# Patient Record
Sex: FEMALE | Race: WHITE | NOT HISPANIC OR LATINO | Employment: OTHER | URBAN - METROPOLITAN AREA
[De-identification: names, ages, dates, MRNs, and addresses within clinical notes are randomized per-mention and may not be internally consistent; named-entity substitution may affect disease eponyms.]

---

## 2017-01-04 RX ORDER — MELOXICAM 15 MG/1
TABLET ORAL
Qty: 90 TABLET | Refills: 0 | Status: SHIPPED | OUTPATIENT
Start: 2017-01-04 | End: 2017-11-30

## 2017-02-20 ENCOUNTER — TELEPHONE (OUTPATIENT)
Dept: INTERNAL MEDICINE | Facility: CLINIC | Age: 67
End: 2017-02-20

## 2017-02-20 DIAGNOSIS — G89.29 CHRONIC LOW BACK PAIN WITH SCIATICA, SCIATICA LATERALITY UNSPECIFIED, UNSPECIFIED BACK PAIN LATERALITY: Primary | ICD-10-CM

## 2017-02-20 DIAGNOSIS — M54.40 CHRONIC LOW BACK PAIN WITH SCIATICA, SCIATICA LATERALITY UNSPECIFIED, UNSPECIFIED BACK PAIN LATERALITY: Primary | ICD-10-CM

## 2017-02-20 NOTE — TELEPHONE ENCOUNTER
Patient called stating that she would like a referral to Fatoumata Shipman physical therapist with [facility] (fax [phone]) for continued sciatic pain, pain in both hips, and inability to walk.  Please advise.

## 2017-03-10 ENCOUNTER — OFFICE VISIT (OUTPATIENT)
Dept: INTERNAL MEDICINE | Facility: CLINIC | Age: 67
End: 2017-03-10

## 2017-03-10 VITALS
HEART RATE: 109 BPM | DIASTOLIC BLOOD PRESSURE: 70 MMHG | SYSTOLIC BLOOD PRESSURE: 124 MMHG | HEIGHT: 67 IN | TEMPERATURE: 98.5 F | WEIGHT: 163.2 LBS | OXYGEN SATURATION: 97 % | BODY MASS INDEX: 25.62 KG/M2

## 2017-03-10 DIAGNOSIS — M54.50 CHRONIC BILATERAL LOW BACK PAIN WITHOUT SCIATICA: ICD-10-CM

## 2017-03-10 DIAGNOSIS — G47.09 OTHER INSOMNIA: ICD-10-CM

## 2017-03-10 DIAGNOSIS — M54.16 LUMBAR RADICULOPATHY: Primary | ICD-10-CM

## 2017-03-10 DIAGNOSIS — F17.200 TOBACCO DEPENDENCE: ICD-10-CM

## 2017-03-10 DIAGNOSIS — G89.29 CHRONIC BILATERAL LOW BACK PAIN WITHOUT SCIATICA: ICD-10-CM

## 2017-03-10 DIAGNOSIS — Z12.39 BREAST CANCER SCREENING: ICD-10-CM

## 2017-03-10 DIAGNOSIS — F33.9 EPISODE OF RECURRENT MAJOR DEPRESSIVE DISORDER, UNSPECIFIED DEPRESSION EPISODE SEVERITY (HCC): ICD-10-CM

## 2017-03-10 PROCEDURE — 99214 OFFICE O/P EST MOD 30 MIN: CPT | Performed by: FAMILY MEDICINE

## 2017-03-10 RX ORDER — AMITRIPTYLINE HYDROCHLORIDE 25 MG/1
25 TABLET, FILM COATED ORAL NIGHTLY
Qty: 90 TABLET | Refills: 2 | Status: SHIPPED | OUTPATIENT
Start: 2017-03-10 | End: 2017-11-30

## 2017-03-10 RX ORDER — ZOLPIDEM TARTRATE 5 MG/1
2.5 TABLET ORAL NIGHTLY PRN
Qty: 30 TABLET | Refills: 0 | Status: SHIPPED | OUTPATIENT
Start: 2017-03-10 | End: 2017-11-30

## 2017-03-10 RX ORDER — TRAMADOL HYDROCHLORIDE 50 MG/1
50 TABLET ORAL EVERY 8 HOURS PRN
Qty: 90 TABLET | Refills: 0 | Status: SHIPPED | OUTPATIENT
Start: 2017-03-10 | End: 2017-04-21 | Stop reason: SDUPTHER

## 2017-03-10 RX ORDER — PREDNISONE 10 MG/1
TABLET ORAL
Qty: 40 TABLET | Refills: 0 | Status: SHIPPED | OUTPATIENT
Start: 2017-03-10 | End: 2017-11-30

## 2017-03-10 NOTE — PROGRESS NOTES
Subjective   Laila Lea is a 66 y.o. female.     Chief Complaint   Patient presents with   • patient in for right hip pain/sciatica     Chronic low back pain insomnia  Piriformis syndrome  History of Present Illness patient comes in for chronic right hip pain low back pain with some intermittent radiating quality down her leg she's not following any loss of bowel or bladder function she has gone to physical therapy and had about 6 visits and exercises were shown to help with his piriformis syndrome shin x-ray of her hip approximately a year and half ago which revealed no evidence arthritis she is using zolpidem intermittently with amitriptyline for sleep and tramadol for low back pain flareup she is not walking as much as she used to because of the pain he had been to [place] and [place] over the last year and was worn out with hiking shedid.  The following portions of the patient's history were reviewed and updated as appropriate: allergies, current medications, past family history, past medical history, past social history, past surgical history and problem list.    Review of Systems   Constitutional: Negative.    HENT: Negative.    Respiratory: Negative.    Cardiovascular: Negative.    Gastrointestinal: Negative.    Genitourinary: Negative.    Musculoskeletal: Positive for arthralgias, back pain, gait problem and myalgias.   Hematological: Negative.    Psychiatric/Behavioral: Negative.        Objective   Physical Exam   Constitutional: She is oriented to person, place, and time. She appears well-developed and well-nourished. No distress.   HENT:   Head: Normocephalic and atraumatic.   Eyes: Conjunctivae and EOM are normal. Pupils are equal, round, and reactive to light. No scleral icterus.   Neck: Normal range of motion. Neck supple.   Cardiovascular: Normal rate, regular rhythm and normal heart sounds.  Exam reveals no gallop.    No murmur heard.  Pulmonary/Chest: Effort normal and breath sounds  normal. No respiratory distress. She has no wheezes. She has no rales. She exhibits no tenderness.   Abdominal: Soft. There is no tenderness.   Musculoskeletal: She exhibits tenderness. She exhibits no deformity.   Neurological: She is alert and oriented to person, place, and time. She has normal reflexes. She displays no atrophy, no tremor and normal reflexes. No sensory deficit. She exhibits normal muscle tone. Coordination normal.   Reflex Scores:       Patellar reflexes are 2+ on the right side and 2+ on the left side.       Achilles reflexes are 2+ on the right side and 2+ on the left side.  Skin: Skin is warm and dry. No rash noted. She is not diaphoretic.   Psychiatric: She has a normal mood and affect. Her behavior is normal. Judgment and thought content normal.   Nursing note and vitals reviewed.      Assessment/Plan   Laila was seen today for patient in for right hip pain/sciatica.    Diagnoses and all orders for this visit:    Lumbar radiculopathy    Episode of recurrent major depressive disorder, unspecified depression episode severity    Other insomnia    Chronic bilateral low back pain without sciatica    Tobacco dependence    Breast cancer screening  -     Mammo screening bilateral; Future    Other orders  -     predniSONE (DELTASONE) 10 MG tablet; 4 for 4 days 3 for 4 days 2 for 4 days 1 for 4 days  -     traMADol (ULTRAM) 50 MG tablet; Take 1 tablet by mouth Every 8 (Eight) Hours As Needed for moderate pain (4-6).  -     zolpidem (AMBIEN) 5 MG tablet; Take 0.5 tablets by mouth At Night As Needed for sleep.  -     amitriptyline (ELAVIL) 25 MG tablet; Take 1 tablet by mouth Every Night.      Follow-up if symptoms persist otherwise as needed  Or 6 months for chronic medical problems

## 2017-04-06 ENCOUNTER — TELEPHONE (OUTPATIENT)
Dept: INTERNAL MEDICINE | Facility: CLINIC | Age: 67
End: 2017-04-06

## 2017-04-06 NOTE — TELEPHONE ENCOUNTER
Patient called stating that she would like a refill for Prednisone 10 mg.  This worked well for her pain.  She does have an appointment with [facility] next Friday.  Please advise.

## 2017-04-10 NOTE — TELEPHONE ENCOUNTER
LMA - patient notified.  Patient asked to call if she would like to have a referral to physical therapy.

## 2017-04-11 RX ORDER — ESCITALOPRAM OXALATE 20 MG/1
TABLET ORAL
Qty: 90 TABLET | Refills: 0 | Status: SHIPPED | OUTPATIENT
Start: 2017-04-11 | End: 2017-10-09 | Stop reason: SDUPTHER

## 2017-04-11 RX ORDER — LISINOPRIL 10 MG/1
TABLET ORAL
Qty: 90 TABLET | Refills: 0 | Status: SHIPPED | OUTPATIENT
Start: 2017-04-11 | End: 2017-07-07 | Stop reason: SDUPTHER

## 2017-04-21 ENCOUNTER — HOSPITAL ENCOUNTER (OUTPATIENT)
Dept: MAMMOGRAPHY | Facility: HOSPITAL | Age: 67
Discharge: HOME | End: 2017-04-21
Admitting: FAMILY MEDICINE

## 2017-04-21 DIAGNOSIS — Z12.39 BREAST CANCER SCREENING: ICD-10-CM

## 2017-04-21 PROCEDURE — G0202 SCR MAMMO BI INCL CAD: HCPCS

## 2017-04-21 RX ORDER — TRAMADOL HYDROCHLORIDE 50 MG/1
TABLET ORAL
Qty: 90 TABLET | Refills: 0 | OUTPATIENT
Start: 2017-04-21 | End: 2017-06-15 | Stop reason: SDUPTHER

## 2017-04-26 ENCOUNTER — TELEPHONE (OUTPATIENT)
Dept: INTERNAL MEDICINE | Facility: CLINIC | Age: 67
End: 2017-04-26

## 2017-04-26 NOTE — TELEPHONE ENCOUNTER
----- Message from James Bourgeois MD sent at 4/25/2017  4:39 PM EDT -----  Mammogram stable follow-up 1 year

## 2017-06-15 RX ORDER — TRAMADOL HYDROCHLORIDE 50 MG/1
TABLET ORAL
Qty: 90 TABLET | Refills: 0 | OUTPATIENT
Start: 2017-06-15 | End: 2017-07-18 | Stop reason: SDUPTHER

## 2017-07-05 ENCOUNTER — TELEPHONE (OUTPATIENT)
Dept: INTERNAL MEDICINE | Facility: CLINIC | Age: 67
End: 2017-07-05

## 2017-07-05 NOTE — TELEPHONE ENCOUNTER
Patient called stating her back pain is getting much worse.  She has been on the Tramadol for so long she does not think that it helps her as much as it once did.  She did try a friend's Lortab yesterday and felt much better.  Patient wanted to know if she could try a different pain medication.  Please advise.

## 2017-07-06 NOTE — TELEPHONE ENCOUNTER
She was seen by orthopedics at [facility] by Dr. Santiago and Dr. Munson recommend follow-up through their office for ongoing management of her lumbar pain

## 2017-07-07 ENCOUNTER — TELEPHONE (OUTPATIENT)
Dept: INTERNAL MEDICINE | Facility: CLINIC | Age: 67
End: 2017-07-07

## 2017-07-07 RX ORDER — ESCITALOPRAM OXALATE 20 MG/1
TABLET ORAL
Qty: 90 TABLET | Refills: 0 | Status: SHIPPED | OUTPATIENT
Start: 2017-07-07 | End: 2017-10-09 | Stop reason: SDUPTHER

## 2017-07-07 RX ORDER — LISINOPRIL 10 MG/1
TABLET ORAL
Qty: 90 TABLET | Refills: 0 | Status: SHIPPED | OUTPATIENT
Start: 2017-07-07 | End: 2017-11-08 | Stop reason: SDUPTHER

## 2017-07-07 NOTE — TELEPHONE ENCOUNTER
Patient called and wanted to thank [place] and Dr. Bourgeois for their time and advising her to meet with Dr. Munson for further consultation.

## 2017-07-18 RX ORDER — TRAMADOL HYDROCHLORIDE 50 MG/1
TABLET ORAL
Qty: 90 TABLET | Refills: 0 | OUTPATIENT
Start: 2017-07-18 | End: 2017-08-28 | Stop reason: SDUPTHER

## 2017-08-29 RX ORDER — TRAMADOL HYDROCHLORIDE 50 MG/1
TABLET ORAL
Qty: 90 TABLET | Refills: 0 | OUTPATIENT
Start: 2017-08-29 | End: 2017-10-26 | Stop reason: SDUPTHER

## 2017-09-05 ENCOUNTER — TELEPHONE (OUTPATIENT)
Dept: INTERNAL MEDICINE | Facility: CLINIC | Age: 67
End: 2017-09-05

## 2017-09-05 DIAGNOSIS — G47.30 SLEEP APNEA, UNSPECIFIED TYPE: Primary | ICD-10-CM

## 2017-09-05 NOTE — TELEPHONE ENCOUNTER
Patient called asking to be referred for a sleep apnea test.  Patient stated that she stays by herself but she has been on a few trips with friends and they have told her that she gasps for air, stops breathing, and makes other sounds while sleeping.  Please advise.

## 2017-10-03 RX ORDER — ESCITALOPRAM OXALATE 20 MG/1
TABLET ORAL
Qty: 90 TABLET | Refills: 0 | OUTPATIENT
Start: 2017-10-03

## 2017-10-09 RX ORDER — ESCITALOPRAM OXALATE 20 MG/1
TABLET ORAL
Qty: 90 TABLET | Refills: 0 | Status: SHIPPED | OUTPATIENT
Start: 2017-10-09 | End: 2017-12-27 | Stop reason: SDUPTHER

## 2017-10-24 ENCOUNTER — TELEPHONE (OUTPATIENT)
Dept: INTERNAL MEDICINE | Facility: CLINIC | Age: 67
End: 2017-10-24

## 2017-10-24 NOTE — TELEPHONE ENCOUNTER
[facility] faxed our office requesting a prescription for Tramadol for the patient. Patient was last seen in the office on 3/10/17. Please advise.

## 2017-10-26 RX ORDER — TRAMADOL HYDROCHLORIDE 50 MG/1
50 TABLET ORAL EVERY 8 HOURS PRN
Qty: 90 TABLET | Refills: 0 | OUTPATIENT
Start: 2017-10-26 | End: 2017-11-30 | Stop reason: SDUPTHER

## 2017-11-08 RX ORDER — LISINOPRIL 10 MG/1
TABLET ORAL
Qty: 90 TABLET | Refills: 0 | Status: SHIPPED | OUTPATIENT
Start: 2017-11-08 | End: 2017-11-30 | Stop reason: SDUPTHER

## 2017-11-30 ENCOUNTER — OFFICE VISIT (OUTPATIENT)
Dept: INTERNAL MEDICINE | Facility: CLINIC | Age: 67
End: 2017-11-30

## 2017-11-30 VITALS
BODY MASS INDEX: 23.93 KG/M2 | SYSTOLIC BLOOD PRESSURE: 154 MMHG | OXYGEN SATURATION: 98 % | TEMPERATURE: 98.6 F | WEIGHT: 152.5 LBS | DIASTOLIC BLOOD PRESSURE: 68 MMHG | HEIGHT: 67 IN | HEART RATE: 88 BPM

## 2017-11-30 DIAGNOSIS — E78.2 MIXED HYPERLIPIDEMIA: ICD-10-CM

## 2017-11-30 DIAGNOSIS — I10 ESSENTIAL HYPERTENSION: Primary | ICD-10-CM

## 2017-11-30 DIAGNOSIS — M54.50 CHRONIC BILATERAL LOW BACK PAIN WITHOUT SCIATICA: ICD-10-CM

## 2017-11-30 DIAGNOSIS — G89.29 CHRONIC BILATERAL LOW BACK PAIN WITHOUT SCIATICA: ICD-10-CM

## 2017-11-30 DIAGNOSIS — Z23 NEED FOR INFLUENZA VACCINATION: ICD-10-CM

## 2017-11-30 DIAGNOSIS — Z23 NEED FOR PNEUMOCOCCAL VACCINATION: ICD-10-CM

## 2017-11-30 DIAGNOSIS — M54.16 LUMBAR RADICULOPATHY: ICD-10-CM

## 2017-11-30 DIAGNOSIS — F33.9 EPISODE OF RECURRENT MAJOR DEPRESSIVE DISORDER, UNSPECIFIED DEPRESSION EPISODE SEVERITY (HCC): ICD-10-CM

## 2017-11-30 DIAGNOSIS — E79.0 HYPERURICEMIA: ICD-10-CM

## 2017-11-30 DIAGNOSIS — E55.9 VITAMIN D DEFICIENCY: ICD-10-CM

## 2017-11-30 DIAGNOSIS — G47.09 OTHER INSOMNIA: ICD-10-CM

## 2017-11-30 PROCEDURE — 99214 OFFICE O/P EST MOD 30 MIN: CPT | Performed by: FAMILY MEDICINE

## 2017-11-30 PROCEDURE — 90670 PCV13 VACCINE IM: CPT | Performed by: FAMILY MEDICINE

## 2017-11-30 PROCEDURE — 90662 IIV NO PRSV INCREASED AG IM: CPT | Performed by: FAMILY MEDICINE

## 2017-11-30 PROCEDURE — G0009 ADMIN PNEUMOCOCCAL VACCINE: HCPCS | Performed by: FAMILY MEDICINE

## 2017-11-30 PROCEDURE — G0008 ADMIN INFLUENZA VIRUS VAC: HCPCS | Performed by: FAMILY MEDICINE

## 2017-11-30 RX ORDER — NAPROXEN SODIUM 220 MG
220 TABLET ORAL 3 TIMES DAILY PRN
COMMUNITY
End: 2018-01-22

## 2017-11-30 RX ORDER — TRAMADOL HYDROCHLORIDE 50 MG/1
50 TABLET ORAL EVERY 8 HOURS PRN
Qty: 90 TABLET | Refills: 0 | Status: SHIPPED | OUTPATIENT
Start: 2017-11-30 | End: 2018-01-02 | Stop reason: SDUPTHER

## 2017-11-30 RX ORDER — LISINOPRIL 10 MG/1
10 TABLET ORAL DAILY
Qty: 90 TABLET | Refills: 2 | Status: SHIPPED | OUTPATIENT
Start: 2017-11-30 | End: 2018-01-22 | Stop reason: DRUGHIGH

## 2017-11-30 RX ORDER — TRAMADOL HYDROCHLORIDE 50 MG/1
TABLET ORAL
Qty: 90 TABLET | Refills: 0 | OUTPATIENT
Start: 2017-11-30

## 2017-11-30 RX ORDER — ARIPIPRAZOLE 2 MG/1
2 TABLET ORAL DAILY
Qty: 30 TABLET | Refills: 6 | Status: SHIPPED | OUTPATIENT
Start: 2017-11-30 | End: 2018-09-24 | Stop reason: SINTOL

## 2017-11-30 NOTE — PROGRESS NOTES
Laila Lea is a 67 y.o. female.      Assessment/Plan   Problem List Items Addressed This Visit        Cardiovascular and Mediastinum    Hyperlipidemia    Relevant Orders    Lipid Panel    Essential hypertension - Primary    Relevant Medications    lisinopril (PRINIVIL,ZESTRIL) 10 MG tablet    Other Relevant Orders    Comprehensive Metabolic Panel       Digestive    Vitamin D deficiency    Relevant Orders    Vitamin D 25 Hydroxy       Nervous and Auditory    Lumbar radiculopathy    Relevant Orders    Compliance Drug Analysis, Ur - Urine, Clean Catch    Chronic bilateral low back pain without sciatica    Relevant Medications    traMADol (ULTRAM) 50 MG tablet       Other    Depression    Relevant Medications    ARIPiprazole (ABILIFY) 2 MG tablet    Insomnia    Hyperuricemia    Relevant Orders    Uric Acid      Other Visit Diagnoses     Need for influenza vaccination        Need for pneumococcal vaccination               Follow-up results of blood work and in 1 month check benefit of initiating the Abilify written prescription for tramadol was given      Chief Complaint   Patient presents with   • follow up to hypertension   • follow up to depression   • follow up to back pain     Social History   Substance Use Topics   • Smoking status: Current Every Day Smoker     Packs/day: 1.00     Years: 50.00   • Smokeless tobacco: Never Used   • Alcohol use No      Comment: stopped drinking       History of Present Illness   She comes in for follow-up of chronic medical problems of hypertension hyperlipidemia vitamin D deficiency chronic low back pain taking narcotic therapy depression.    She feels that her depression is worsening she's more apathetic difficult to become motivated isolating herself and feels that things aren't not as well-controlled as it had been in the past she also has having more disturbed sleep blood pressure seems to be well-controlled although she doesn't check.  Often she's had some stressful  "social situations that have caused her to be more anxious she is eating less and as a result has been losing some weight she doesn't have any specific abdominal pain or any other concerns other than she needs her tramadol replenished  He'll also have laboratory evaluation of chronic medical problems and immunizations administered as needed.    The following portions of the patient's history were reviewed and updated as appropriate:PMHroutine: Social history , Past Medical History, Allergies, Current Medications, Active Problem List and Health Maintenance    Review of Systems   Constitutional: Negative.    HENT: Negative.    Respiratory: Negative.    Cardiovascular: Negative.    Gastrointestinal: Negative.    Genitourinary: Negative.    Musculoskeletal: Positive for arthralgias, back pain, gait problem and myalgias.   Hematological: Negative.    Psychiatric/Behavioral: Negative.        Objective   Vitals:    11/30/17 1453   BP: 154/68   BP Location: Right arm   Patient Position: Sitting   Cuff Size: Adult   Pulse: 88   Temp: 98.6 °F (37 °C)   TempSrc: Oral   SpO2: 98%   Weight: 152 lb 8 oz (69.2 kg)   Height: 67\" (170.2 cm)     Body mass index is 23.88 kg/(m^2).  Physical Exam   Constitutional: She is oriented to person, place, and time. She appears well-developed and well-nourished. No distress.   HENT:   Head: Normocephalic and atraumatic.   Right Ear: External ear normal.   Left Ear: External ear normal.   Nose: Nose normal.   Mouth/Throat: Oropharynx is clear and moist.   Eyes: Conjunctivae and EOM are normal. Pupils are equal, round, and reactive to light. No scleral icterus.   Neck: Normal range of motion. Neck supple.   Cardiovascular: Normal rate, regular rhythm and normal heart sounds.  Exam reveals no gallop.    No murmur heard.  Pulmonary/Chest: Effort normal and breath sounds normal. No respiratory distress. She has no wheezes. She has no rales. She exhibits no tenderness.   Abdominal: Soft. There is no " tenderness.   Musculoskeletal: She exhibits no edema, tenderness or deformity.   Neurological: She is alert and oriented to person, place, and time. She has normal reflexes. She displays no atrophy, no tremor and normal reflexes. No sensory deficit. She exhibits normal muscle tone. Coordination normal.   Reflex Scores:       Patellar reflexes are 2+ on the right side and 2+ on the left side.       Achilles reflexes are 2+ on the right side and 2+ on the left side.  Skin: Skin is warm and dry. No rash noted. She is not diaphoretic.   Psychiatric: She has a normal mood and affect. Her behavior is normal. Judgment and thought content normal.   Nursing note and vitals reviewed.    Reviewed Data:  No visits with results within 1 Month(s) from this visit.  Latest known visit with results is:    Results Encounter on 12/05/2016   Component Date Value Ref Range Status   • Glucose 12/07/2016 93  65 - 99 mg/dL Final   • BUN 12/07/2016 14  8 - 23 mg/dL Final   • Creatinine 12/07/2016 1.00  0.57 - 1.00 mg/dL Final   • eGFR Non African Am 12/07/2016 55* >60 mL/min/1.73 Final   • eGFR African Am 12/07/2016 67  >60 mL/min/1.73 Final   • BUN/Creatinine Ratio 12/07/2016 14.0  7.0 - 25.0 Final   • Sodium 12/07/2016 144  136 - 145 mmol/L Final   • Potassium 12/07/2016 4.0  3.5 - 5.2 mmol/L Final   • Chloride 12/07/2016 104  98 - 107 mmol/L Final   • Total CO2 12/07/2016 27.4  22.0 - 29.0 mmol/L Final   • Calcium 12/07/2016 9.7  8.6 - 10.5 mg/dL Final   • Total Protein 12/07/2016 6.3  6.0 - 8.5 g/dL Final   • Albumin 12/07/2016 4.10  3.50 - 5.20 g/dL Final   • Globulin 12/07/2016 2.2  gm/dL Final   • A/G Ratio 12/07/2016 1.9  g/dL Final   • Total Bilirubin 12/07/2016 0.4  0.1 - 1.2 mg/dL Final   • Alkaline Phosphatase 12/07/2016 55  39 - 117 U/L Final   • AST (SGOT) 12/07/2016 19  1 - 32 U/L Final   • ALT (SGPT) 12/07/2016 12  1 - 33 U/L Final   • Uric Acid 12/07/2016 4.9  2.4 - 5.7 mg/dL Final

## 2017-12-01 ENCOUNTER — TELEPHONE (OUTPATIENT)
Dept: INTERNAL MEDICINE | Facility: CLINIC | Age: 67
End: 2017-12-01

## 2017-12-01 LAB
25(OH)D3+25(OH)D2 SERPL-MCNC: 24.3 NG/ML (ref 30–100)
ALBUMIN SERPL-MCNC: 4.4 G/DL (ref 3.5–5.2)
ALBUMIN/GLOB SERPL: 1.6 G/DL
ALP SERPL-CCNC: 70 U/L (ref 39–117)
ALT SERPL-CCNC: 12 U/L (ref 1–33)
AST SERPL-CCNC: 18 U/L (ref 1–32)
BILIRUB SERPL-MCNC: 0.5 MG/DL (ref 0.1–1.2)
BUN SERPL-MCNC: 15 MG/DL (ref 8–23)
BUN/CREAT SERPL: 17 (ref 7–25)
CALCIUM SERPL-MCNC: 9.9 MG/DL (ref 8.6–10.5)
CHLORIDE SERPL-SCNC: 102 MMOL/L (ref 98–107)
CHOLEST SERPL-MCNC: 219 MG/DL (ref 0–200)
CO2 SERPL-SCNC: 27.7 MMOL/L (ref 22–29)
CREAT SERPL-MCNC: 0.88 MG/DL (ref 0.57–1)
GFR SERPLBLD CREATININE-BSD FMLA CKD-EPI: 64 ML/MIN/1.73
GFR SERPLBLD CREATININE-BSD FMLA CKD-EPI: 77 ML/MIN/1.73
GLOBULIN SER CALC-MCNC: 2.7 GM/DL
GLUCOSE SERPL-MCNC: 92 MG/DL (ref 65–99)
HDLC SERPL-MCNC: 54 MG/DL (ref 40–60)
LDLC SERPL CALC-MCNC: 142 MG/DL (ref 0–100)
POTASSIUM SERPL-SCNC: 4.2 MMOL/L (ref 3.5–5.2)
PROT SERPL-MCNC: 7.1 G/DL (ref 6–8.5)
SODIUM SERPL-SCNC: 144 MMOL/L (ref 136–145)
TRIGL SERPL-MCNC: 114 MG/DL (ref 0–150)
URATE SERPL-MCNC: 5.7 MG/DL (ref 2.4–5.7)
VLDLC SERPL CALC-MCNC: 22.8 MG/DL (ref 5–40)

## 2017-12-01 NOTE — TELEPHONE ENCOUNTER
----- Message from James Bourgeois MD sent at 12/1/2017  8:04 AM EST -----  recommend vitamin D3 5000 units daily

## 2017-12-27 RX ORDER — ESCITALOPRAM OXALATE 20 MG/1
TABLET ORAL
Qty: 90 TABLET | Refills: 0 | Status: SHIPPED | OUTPATIENT
Start: 2017-12-27 | End: 2018-03-25 | Stop reason: SDUPTHER

## 2017-12-30 ENCOUNTER — RESULTS ENCOUNTER (OUTPATIENT)
Dept: INTERNAL MEDICINE | Facility: CLINIC | Age: 67
End: 2017-12-30

## 2017-12-30 DIAGNOSIS — M54.16 LUMBAR RADICULOPATHY: ICD-10-CM

## 2018-01-02 DIAGNOSIS — M54.50 CHRONIC BILATERAL LOW BACK PAIN WITHOUT SCIATICA: ICD-10-CM

## 2018-01-02 DIAGNOSIS — G89.29 CHRONIC BILATERAL LOW BACK PAIN WITHOUT SCIATICA: ICD-10-CM

## 2018-01-02 RX ORDER — TRAMADOL HYDROCHLORIDE 50 MG/1
TABLET ORAL
Qty: 90 TABLET | Refills: 0 | Status: SHIPPED | OUTPATIENT
Start: 2018-01-02 | End: 2018-01-04 | Stop reason: SDUPTHER

## 2018-01-04 DIAGNOSIS — G89.29 CHRONIC BILATERAL LOW BACK PAIN WITHOUT SCIATICA: ICD-10-CM

## 2018-01-04 DIAGNOSIS — M54.50 CHRONIC BILATERAL LOW BACK PAIN WITHOUT SCIATICA: ICD-10-CM

## 2018-01-04 RX ORDER — TRAMADOL HYDROCHLORIDE 50 MG/1
50 TABLET ORAL EVERY 8 HOURS PRN
Qty: 90 TABLET | Refills: 0 | OUTPATIENT
Start: 2018-01-04 | End: 2018-03-01 | Stop reason: SDUPTHER

## 2018-01-16 RX ORDER — BUTALBITAL, ACETAMINOPHEN AND CAFFEINE 50; 325; 40 MG/1; MG/1; MG/1
TABLET ORAL
Qty: 10 TABLET | Refills: 0 | OUTPATIENT
Start: 2018-01-16

## 2018-01-22 ENCOUNTER — OFFICE VISIT (OUTPATIENT)
Dept: INTERNAL MEDICINE | Facility: CLINIC | Age: 68
End: 2018-01-22

## 2018-01-22 VITALS
TEMPERATURE: 98.2 F | HEIGHT: 67 IN | SYSTOLIC BLOOD PRESSURE: 126 MMHG | WEIGHT: 150.4 LBS | OXYGEN SATURATION: 97 % | HEART RATE: 102 BPM | BODY MASS INDEX: 23.61 KG/M2 | DIASTOLIC BLOOD PRESSURE: 68 MMHG

## 2018-01-22 DIAGNOSIS — I63.532 CEREBROVASCULAR ACCIDENT (CVA) DUE TO OCCLUSION OF LEFT POSTERIOR CEREBRAL ARTERY (HCC): ICD-10-CM

## 2018-01-22 DIAGNOSIS — F17.219 CIGARETTE NICOTINE DEPENDENCE WITH NICOTINE-INDUCED DISORDER: ICD-10-CM

## 2018-01-22 DIAGNOSIS — E78.2 MIXED HYPERLIPIDEMIA: ICD-10-CM

## 2018-01-22 DIAGNOSIS — I10 ESSENTIAL HYPERTENSION: Primary | ICD-10-CM

## 2018-01-22 PROBLEM — F17.200 NICOTINE DEPENDENCE: Status: ACTIVE | Noted: 2018-01-22

## 2018-01-22 PROCEDURE — 99214 OFFICE O/P EST MOD 30 MIN: CPT | Performed by: FAMILY MEDICINE

## 2018-01-22 RX ORDER — ASPIRIN 81 MG
TABLET,CHEWABLE ORAL
Refills: 3 | COMMUNITY
Start: 2018-01-11 | End: 2018-01-22 | Stop reason: DRUGHIGH

## 2018-01-22 RX ORDER — TRAMADOL HYDROCHLORIDE 50 MG/1
50 TABLET ORAL
COMMUNITY
End: 2018-01-22 | Stop reason: DRUGHIGH

## 2018-01-22 RX ORDER — LISINOPRIL 30 MG/1
10 TABLET ORAL DAILY
COMMUNITY
End: 2018-03-01 | Stop reason: DRUGHIGH

## 2018-01-22 RX ORDER — ASPIRIN 325 MG
325 TABLET ORAL DAILY
COMMUNITY

## 2018-01-22 RX ORDER — ATORVASTATIN CALCIUM 40 MG/1
40 TABLET, FILM COATED ORAL DAILY
COMMUNITY
End: 2018-06-05 | Stop reason: SDUPTHER

## 2018-01-22 NOTE — PROGRESS NOTES
Laila Lea is a 67 y.o. female.      Assessment/Plan   Problem List Items Addressed This Visit        Cardiovascular and Mediastinum    Hyperlipidemia    Relevant Medications    atorvastatin (LIPITOR) 40 MG tablet    Essential hypertension - Primary    Relevant Medications    lisinopril (PRINIVIL,ZESTRIL) 30 MG tablet    Cerebrovascular accident (CVA) due to occlusion of left posterior cerebral artery    Overview     Jonathan 1/2018            Other    Nicotine dependence           Patient will control her blood pressure continue the aspirin as well as atorvastatin try and wean off nicotine products she'll follow up otherwise as needed or in 3-6 months      Chief Complaint   Patient presents with   • Jonathan Calderon follow up to stroke     Social History   Substance Use Topics   • Smoking status: Current Some Day Smoker     Packs/day: 1.00     Years: 50.00   • Smokeless tobacco: Never Used   • Alcohol use No      Comment: stopped drinking       History of Present Illness   Patient comes in for follow-up after hospital discharge for CVA of less posterior cerebral artery she has no acute concerns at this time her blood pressure is well-controlled for concern reviewed from hospitalization January 8 January 11 Loop Recorder was placed.  She is trying to quit smoking using the pain patch was also started on aspirin therapy her lisinopril was increased to 30 mg daily is also started on atorvastatin 40 mg daily  The following portions of the patient's history were reviewed and updated as appropriate:PMHroutine: Social history , Past Medical History, Allergies, Current Medications, Active Problem List and Health Maintenance    Review of Systems   Constitutional: Negative.    HENT: Negative.    Eyes: Positive for visual disturbance.   Respiratory: Negative.    Cardiovascular: Negative.    Gastrointestinal: Negative.    Musculoskeletal: Negative.    Psychiatric/Behavioral: Negative.        Objective   Vitals:     "01/22/18 1443   BP: 126/68   BP Location: Right arm   Patient Position: Sitting   Cuff Size: Adult   Pulse: 102   Temp: 98.2 °F (36.8 °C)   TempSrc: Oral   SpO2: 97%   Weight: 68.2 kg (150 lb 6.4 oz)   Height: 170.2 cm (67\")     Body mass index is 23.56 kg/(m^2).  Physical Exam   Constitutional: She appears well-developed and well-nourished.   HENT:   Head: Normocephalic and atraumatic.   Neck: Normal range of motion. No thyromegaly present.   Cardiovascular: Normal rate and regular rhythm.    Pulses:       Carotid pulses are 2+ on the right side, and 2+ on the left side with bruit.  Pulmonary/Chest: Effort normal and breath sounds normal.   Psychiatric: She has a normal mood and affect. Her behavior is normal. Judgment and thought content normal.   Nursing note and vitals reviewed.    Reviewed Data:  No visits with results within 1 Month(s) from this visit.  Latest known visit with results is:    Office Visit on 11/30/2017   Component Date Value Ref Range Status   • Glucose 11/30/2017 92  65 - 99 mg/dL Final   • BUN 11/30/2017 15  8 - 23 mg/dL Final   • Creatinine 11/30/2017 0.88  0.57 - 1.00 mg/dL Final   • eGFR Non  Am 11/30/2017 64  >60 mL/min/1.73 Final   • eGFR African Am 11/30/2017 77  >60 mL/min/1.73 Final   • BUN/Creatinine Ratio 11/30/2017 17.0  7.0 - 25.0 Final   • Sodium 11/30/2017 144  136 - 145 mmol/L Final   • Potassium 11/30/2017 4.2  3.5 - 5.2 mmol/L Final   • Chloride 11/30/2017 102  98 - 107 mmol/L Final   • Total CO2 11/30/2017 27.7  22.0 - 29.0 mmol/L Final   • Calcium 11/30/2017 9.9  8.6 - 10.5 mg/dL Final   • Total Protein 11/30/2017 7.1  6.0 - 8.5 g/dL Final   • Albumin 11/30/2017 4.40  3.50 - 5.20 g/dL Final   • Globulin 11/30/2017 2.7  gm/dL Final   • A/G Ratio 11/30/2017 1.6  g/dL Final   • Total Bilirubin 11/30/2017 0.5  0.1 - 1.2 mg/dL Final   • Alkaline Phosphatase 11/30/2017 70  39 - 117 U/L Final   • AST (SGOT) 11/30/2017 18  1 - 32 U/L Final   • ALT (SGPT) 11/30/2017 12  1 - " 33 U/L Final   • Uric Acid 11/30/2017 5.7  2.4 - 5.7 mg/dL Final   • 25 Hydroxy, Vitamin D 11/30/2017 24.3* 30.0 - 100.0 ng/mL Final    Comment: Reference Range for Total Vitamin D 25(OH)  Deficiency    <20.0 ng/mL  Insufficiency 21-29 ng/mL  Sufficiency    ng/mL  Toxicity      >100 ng/ml        • Total Cholesterol 11/30/2017 219* 0 - 200 mg/dL Final   • Triglycerides 11/30/2017 114  0 - 150 mg/dL Final   • HDL Cholesterol 11/30/2017 54  40 - 60 mg/dL Final   • VLDL Cholesterol 11/30/2017 22.8  5 - 40 mg/dL Final   • LDL Cholesterol  11/30/2017 142* 0 - 100 mg/dL Final        Current outpatient and discharge medications have been reconciled for the patient.  James Bourgeois MD

## 2018-03-01 ENCOUNTER — OFFICE VISIT (OUTPATIENT)
Dept: INTERNAL MEDICINE | Facility: CLINIC | Age: 68
End: 2018-03-01

## 2018-03-01 VITALS
OXYGEN SATURATION: 97 % | BODY MASS INDEX: 24.42 KG/M2 | HEIGHT: 67 IN | DIASTOLIC BLOOD PRESSURE: 87 MMHG | WEIGHT: 155.6 LBS | HEART RATE: 88 BPM | RESPIRATION RATE: 16 BRPM | SYSTOLIC BLOOD PRESSURE: 161 MMHG

## 2018-03-01 DIAGNOSIS — I10 ESSENTIAL HYPERTENSION: Primary | ICD-10-CM

## 2018-03-01 DIAGNOSIS — R93.6 ABNORMAL FINDINGS ON DIAGNOSTIC IMAGING OF LIMBS: ICD-10-CM

## 2018-03-01 DIAGNOSIS — Z12.2 ENCOUNTER FOR SCREENING FOR LUNG CANCER: ICD-10-CM

## 2018-03-01 DIAGNOSIS — M54.50 CHRONIC BILATERAL LOW BACK PAIN WITHOUT SCIATICA: ICD-10-CM

## 2018-03-01 DIAGNOSIS — Z87.891 PERSONAL HISTORY OF NICOTINE DEPENDENCE: ICD-10-CM

## 2018-03-01 DIAGNOSIS — G89.29 CHRONIC BILATERAL LOW BACK PAIN WITHOUT SCIATICA: ICD-10-CM

## 2018-03-01 PROBLEM — F17.200 TOBACCO USE DISORDER: Status: ACTIVE | Noted: 2018-03-01

## 2018-03-01 PROCEDURE — 96160 PT-FOCUSED HLTH RISK ASSMT: CPT | Performed by: FAMILY MEDICINE

## 2018-03-01 PROCEDURE — G0438 PPPS, INITIAL VISIT: HCPCS | Performed by: FAMILY MEDICINE

## 2018-03-01 PROCEDURE — 99214 OFFICE O/P EST MOD 30 MIN: CPT | Performed by: FAMILY MEDICINE

## 2018-03-01 RX ORDER — AMOXICILLIN AND CLAVULANATE POTASSIUM 875; 125 MG/1; MG/1
1 TABLET, FILM COATED ORAL
COMMUNITY
Start: 2018-02-26 | End: 2018-03-08

## 2018-03-01 RX ORDER — MELOXICAM 15 MG/1
TABLET ORAL
COMMUNITY
Start: 2018-02-05 | End: 2018-08-15 | Stop reason: SDUPTHER

## 2018-03-01 RX ORDER — LISINOPRIL 30 MG/1
30 TABLET ORAL DAILY
Qty: 90 TABLET | Refills: 2 | Status: SHIPPED | OUTPATIENT
Start: 2018-03-01 | End: 2019-01-06 | Stop reason: SDUPTHER

## 2018-03-01 RX ORDER — TRAMADOL HYDROCHLORIDE 50 MG/1
50 TABLET ORAL EVERY 8 HOURS PRN
Qty: 90 TABLET | Refills: 0 | Status: SHIPPED | OUTPATIENT
Start: 2018-03-01 | End: 2018-04-09 | Stop reason: SDUPTHER

## 2018-03-01 RX ORDER — LISINOPRIL 10 MG/1
TABLET ORAL
Refills: 2 | COMMUNITY
Start: 2018-02-03 | End: 2018-03-01 | Stop reason: SDUPTHER

## 2018-03-01 NOTE — PROGRESS NOTES
QUICK REFERENCE INFORMATION:  The ABCs of the Annual Wellness Visit    Initial Medicare Wellness Visit    HEALTH RISK ASSESSMENT    1950    Recent Hospitalizations:  Recently treated at the following:  Other: [facility].        Current Medical Providers:  Patient Care Team:  James Bourgeois MD as PCP - General  James Bourgeois MD as PCP - Family Medicine        Smoking Status:  History   Smoking Status   • Current Some Day Smoker   • Packs/day: 1.00   • Years: 50.00   Smokeless Tobacco   • Never Used       Alcohol Consumption:  History   Alcohol Use No     Comment: stopped drinking       Depression Screen:   PHQ-2/PHQ-9 Depression Screening 3/1/2018   Little interest or pleasure in doing things 2   Feeling down, depressed, or hopeless 3   Trouble falling or staying asleep, or sleeping too much 3   Feeling tired or having little energy 3   Poor appetite or overeating 3   Feeling bad about yourself - or that you are a failure or have let yourself or your family down 3   Trouble concentrating on things, such as reading the newspaper or watching television 3   Moving or speaking so slowly that other people could have noticed. Or the opposite - being so fidgety or restless that you have been moving around a lot more than usual 1   Thoughts that you would be better off dead, or of hurting yourself in some way 0   Total Score 21   If you checked off any problems, how difficult have these problems made it for you to do your work, take care of things at home, or get along with other people? Somewhat difficult       Health Habits and Functional and Cognitive Screening:  Functional & Cognitive Status 3/1/2018   Do you have difficulty preparing food and eating? No   Do you have difficulty bathing yourself, getting dressed or grooming yourself? No   Do you have difficulty using the toilet? No   Do you have difficulty moving around from place to place? Yes   Do you have trouble with steps or getting out of a bed or a chair? Yes    In the past year have you fallen or experienced a near fall? Yes   Current Diet Well Balanced Diet   Dental Exam Up to date   Eye Exam Up to date   Exercise (times per week) 7 times per week   Current Exercise Activities Include Walking   Do you need help using the phone?  No   Are you deaf or do you have serious difficulty hearing?  No   Do you need help with transportation? No   Do you need help shopping? No   Do you need help preparing meals?  No   Do you need help with housework?  No   Do you need help with laundry? No   Do you need help taking your medications? No   Do you need help managing money? Yes   Have you felt unusual stress, anger or loneliness in the last month? Yes   Who do you live with? Alone   If you need help, do you have trouble finding someone available to you? Yes   Have you been bothered in the last four weeks by sexual problems? No   Do you have difficulty concentrating, remembering or making decisions? Yes           Does the patient have evidence of cognitive impairment? No    Asiprin use counseling: Taking ASA appropriately as indicated      Recent Lab Results:    Visual Acuity:  No exam data present    Age-appropriate Screening Schedule:  Refer to the list below for future screening recommendations based on patient's age, sex and/or medical conditions. Orders for these recommended tests are listed in the plan section. The patient has been provided with a written plan.    Health Maintenance   Topic Date Due   • TDAP/TD VACCINES (1 - Tdap) 08/01/1968   • ZOSTER VACCINE  03/07/2016   • DXA SCAN  07/19/2016   • PNEUMOCOCCAL VACCINES (65+ LOW/MEDIUM RISK) (2 of 2 - PPSV23) 11/30/2018   • LIPID PANEL  11/30/2018   • COLONOSCOPY  01/01/2022   • INFLUENZA VACCINE  Completed        Subjective   History of Present Illness    Laila Lea is a 67 y.o. female who presents for an Annual Wellness Visit.    The following portions of the patient's history were reviewed and updated as appropriate:  allergies, current medications, past family history, past medical history, past social history, past surgical history and problem list.    Outpatient Medications Prior to Visit   Medication Sig Dispense Refill   • acetaminophen (TYLENOL) 500 MG tablet Take 1,000 mg by mouth Daily As Needed.     • ARIPiprazole (ABILIFY) 2 MG tablet Take 1 tablet by mouth Daily. 30 tablet 6   • aspirin 325 MG tablet Take 325 mg by mouth Daily.     • atorvastatin (LIPITOR) 40 MG tablet Take 40 mg by mouth Daily.     • cetirizine (ZyrTEC) 10 MG tablet Take 10 mg by mouth Daily As Needed.     • escitalopram (LEXAPRO) 20 MG tablet TAKE 1 TABLET BY MOUTH EVERY MORNING 90 tablet 0   • Psyllium (METAMUCIL PO) Take 1 dose by mouth daily as needed.     • lisinopril (PRINIVIL,ZESTRIL) 30 MG tablet Take 10 mg by mouth Daily.     • traMADol (ULTRAM) 50 MG tablet Take 1 tablet by mouth Every 8 (Eight) Hours As Needed for Moderate Pain . 90 tablet 0   • Cholecalciferol (VITAMIN D3) 5000 units tablet tablet Take 1 tablet by mouth Daily. 30 tablet      No facility-administered medications prior to visit.        Patient Active Problem List   Diagnosis   • Osteopenia   • Tension headache   • Vitamin D deficiency   • Benign neoplasm of adrenal cortex   • Adrenal gland neoplasm   • Claudication   • Cervical radiculopathy   • Cyst of finger   • Depression   • Eczema   • Serum creatinine raised   • Fatigue   • Hyperlipidemia   • Essential hypertension   • Insomnia   • Lumbar radiculopathy   • Sciatica   • Tachycardia   • Chronic bilateral low back pain without sciatica   • Hyperuricemia   • Cerebrovascular accident (CVA) due to occlusion of left posterior cerebral artery   • Nicotine dependence       Advance Care Planning:  has an advance directive - a copy HAS NOT been provided. Have asked the patient to send this to us to add to record.    Identification of Risk Factors:  Risk factors include: cardiovascular risk, increased fall risk and  "depression.    Review of Systems    Compared to one year ago, the patient feels her physical health is the same.  Compared to one year ago, the patient feels her mental health is the same.    Objective     Physical Exam    Vitals:    03/01/18 1435   BP: 161/87   BP Location: Right arm   Patient Position: Sitting   Cuff Size: Adult   Pulse: 88   Resp: 16   SpO2: 97%   Weight: 70.6 kg (155 lb 9.6 oz)   Height: 170.2 cm (67\")   PainSc:   6   PainLoc: Hip       Body mass index is 24.37 kg/(m^2).  Discussed the patient's BMI with her. BMI is above normal parameters. Follow-up plan includes:  nutrition counseling.    Assessment/Plan   Patient Self-Management and Personalized Health Advice  The patient has been provided with information about: diet and fall prevention and preventive services including:   · Bone densitometry screening, Low-dose CT scan for lung cancer screening.    Visit Diagnoses:    ICD-10-CM ICD-9-CM   1. Essential hypertension I10 401.9   2. Chronic bilateral low back pain without sciatica M54.5 724.2    G89.29 338.29       No orders of the defined types were placed in this encounter.      Outpatient Encounter Prescriptions as of 3/1/2018   Medication Sig Dispense Refill   • acetaminophen (TYLENOL) 500 MG tablet Take 1,000 mg by mouth Daily As Needed.     • amoxicillin-clavulanate (AUGMENTIN) 875-125 MG per tablet Take 1 tablet by mouth.     • ARIPiprazole (ABILIFY) 2 MG tablet Take 1 tablet by mouth Daily. 30 tablet 6   • aspirin 325 MG tablet Take 325 mg by mouth Daily.     • atorvastatin (LIPITOR) 40 MG tablet Take 40 mg by mouth Daily.     • cetirizine (ZyrTEC) 10 MG tablet Take 10 mg by mouth Daily As Needed.     • escitalopram (LEXAPRO) 20 MG tablet TAKE 1 TABLET BY MOUTH EVERY MORNING 90 tablet 0   • meloxicam (MOBIC) 15 MG tablet TK 1 T PO D     • Psyllium (METAMUCIL PO) Take 1 dose by mouth daily as needed.     • traMADol (ULTRAM) 50 MG tablet Take 1 tablet by mouth Every 8 (Eight) Hours As " Needed for Moderate Pain . 90 tablet 0   • [DISCONTINUED] lisinopril (PRINIVIL,ZESTRIL) 30 MG tablet Take 10 mg by mouth Daily.     • [DISCONTINUED] traMADol (ULTRAM) 50 MG tablet Take 1 tablet by mouth Every 8 (Eight) Hours As Needed for Moderate Pain . 90 tablet 0   • lisinopril (PRINIVIL,ZESTRIL) 30 MG tablet Take 1 tablet by mouth Daily. 90 tablet 2   • [DISCONTINUED] Cholecalciferol (VITAMIN D3) 5000 units tablet tablet Take 1 tablet by mouth Daily. 30 tablet    • [DISCONTINUED] lisinopril (PRINIVIL,ZESTRIL) 10 MG tablet TK 1 T PO D  2     No facility-administered encounter medications on file as of 3/1/2018.        Reviewed use of high risk medication in the elderly: yes  Reviewed for potential of harmful drug interactions in the elderly: yes    Follow Up:   follow-up for chronic medical problems    An After Visit Summary and PPPS with all of these plans were given to the patient.

## 2018-03-01 NOTE — PROGRESS NOTES
Laila Lea is a 67 y.o. female.      Assessment/Plan   Problem List Items Addressed This Visit        Cardiovascular and Mediastinum    Essential hypertension - Primary    Relevant Medications    lisinopril (PRINIVIL,ZESTRIL) 30 MG tablet       Nervous and Auditory    Chronic bilateral low back pain without sciatica    Relevant Medications    traMADol (ULTRAM) 50 MG tablet    Other Relevant Orders    DEXA Bone Density Axial      Other Visit Diagnoses     Abnormal findings on diagnostic imaging of limbs         Relevant Orders    DEXA Bone Density Axial           Follow-up results of increasing lisinopril back to 30 mg daily monitor blood pressure goal is less than 140/90 follow-up results of DEXA scan continue tramadol use intermittently for chronic low back pain follow-up results of DEXA scan and low-dose CT scan    Chief Complaint   Patient presents with   • follow up for back pain     needs tramadol refill   • initial medicare wellness     Social History   Substance Use Topics   • Smoking status: Current Some Day Smoker     Packs/day: 1.00     Years: 50.00   • Smokeless tobacco: Never Used   • Alcohol use No      Comment: stopped drinking       History of Present Illness   She comes in for follow-up of chronic medical problems of hypertension low back pain and history of osteopenia she has no chest pain shortness of breath increased fatigue she's been like a refill her tramadol could seems to help her low back pain.  Her blood pressure has crept up because his been confusion as to which dose of lisinopril she has been taking she's been only taking 10 mg we'll she has should be taking 30 mrem daily should like to adjust that dose today also  The following portions of the patient's history were reviewed and updated as appropriate:PMHroutine: Social history , Past Medical History, Allergies, Current Medications, Active Problem List, Family History and Health Maintenance    Review of Systems   Constitutional:  "Negative.    HENT: Negative.    Eyes: Negative for visual disturbance.   Respiratory: Negative.    Cardiovascular: Negative.    Gastrointestinal: Negative.    Endocrine: Negative.    Genitourinary: Negative.    Musculoskeletal: Negative.    Allergic/Immunologic: Negative.    Neurological: Negative.    Hematological: Negative.    Psychiatric/Behavioral: Negative.        Objective   Vitals:    03/01/18 1435   BP: 161/87   BP Location: Right arm   Patient Position: Sitting   Cuff Size: Adult   Pulse: 88   Resp: 16   SpO2: 97%   Weight: 70.6 kg (155 lb 9.6 oz)   Height: 170.2 cm (67\")     Body mass index is 24.37 kg/(m^2).  Physical Exam   Constitutional: She is oriented to person, place, and time. She appears well-developed and well-nourished. No distress.   HENT:   Head: Normocephalic and atraumatic.   Right Ear: External ear normal.   Left Ear: External ear normal.   Mouth/Throat: Oropharynx is clear and moist.   Eyes: Conjunctivae and EOM are normal. Pupils are equal, round, and reactive to light. Right eye exhibits no discharge. Left eye exhibits no discharge. No scleral icterus.   Neck: Normal range of motion. Neck supple. No tracheal deviation present. No thyromegaly present.   Cardiovascular: Normal rate, regular rhythm, normal heart sounds, intact distal pulses and normal pulses.  Exam reveals no gallop.    No murmur heard.  Pulmonary/Chest: Effort normal and breath sounds normal. No respiratory distress. She has no wheezes. She has no rales.   Abdominal: Soft. Bowel sounds are normal. She exhibits no distension. There is no tenderness.   Musculoskeletal: Normal range of motion.   Neurological: She is alert and oriented to person, place, and time. She exhibits normal muscle tone. Coordination normal.   Skin: Skin is warm. No rash noted. No erythema. No pallor.   Psychiatric: She has a normal mood and affect. Her behavior is normal. Judgment and thought content normal.   Nursing note and vitals " reviewed.    Reviewed Data:  No visits with results within 1 Month(s) from this visit.  Latest known visit with results is:    Office Visit on 11/30/2017   Component Date Value Ref Range Status   • Glucose 11/30/2017 92  65 - 99 mg/dL Final   • BUN 11/30/2017 15  8 - 23 mg/dL Final   • Creatinine 11/30/2017 0.88  0.57 - 1.00 mg/dL Final   • eGFR Non  Am 11/30/2017 64  >60 mL/min/1.73 Final   • eGFR African Am 11/30/2017 77  >60 mL/min/1.73 Final   • BUN/Creatinine Ratio 11/30/2017 17.0  7.0 - 25.0 Final   • Sodium 11/30/2017 144  136 - 145 mmol/L Final   • Potassium 11/30/2017 4.2  3.5 - 5.2 mmol/L Final   • Chloride 11/30/2017 102  98 - 107 mmol/L Final   • Total CO2 11/30/2017 27.7  22.0 - 29.0 mmol/L Final   • Calcium 11/30/2017 9.9  8.6 - 10.5 mg/dL Final   • Total Protein 11/30/2017 7.1  6.0 - 8.5 g/dL Final   • Albumin 11/30/2017 4.40  3.50 - 5.20 g/dL Final   • Globulin 11/30/2017 2.7  gm/dL Final   • A/G Ratio 11/30/2017 1.6  g/dL Final   • Total Bilirubin 11/30/2017 0.5  0.1 - 1.2 mg/dL Final   • Alkaline Phosphatase 11/30/2017 70  39 - 117 U/L Final   • AST (SGOT) 11/30/2017 18  1 - 32 U/L Final   • ALT (SGPT) 11/30/2017 12  1 - 33 U/L Final   • Uric Acid 11/30/2017 5.7  2.4 - 5.7 mg/dL Final   • 25 Hydroxy, Vitamin D 11/30/2017 24.3* 30.0 - 100.0 ng/mL Final    Comment: Reference Range for Total Vitamin D 25(OH)  Deficiency    <20.0 ng/mL  Insufficiency 21-29 ng/mL  Sufficiency    ng/mL  Toxicity      >100 ng/ml        • Total Cholesterol 11/30/2017 219* 0 - 200 mg/dL Final   • Triglycerides 11/30/2017 114  0 - 150 mg/dL Final   • HDL Cholesterol 11/30/2017 54  40 - 60 mg/dL Final   • VLDL Cholesterol 11/30/2017 22.8  5 - 40 mg/dL Final   • LDL Cholesterol  11/30/2017 142* 0 - 100 mg/dL Final          Low-Dose Lung Cancer CT Screening Visit    CHIEF COMPLAINT:    Shared Decision Making  I am discussing tobacco cessation today with Laila Lea    SMOKING HISTORY:     History   Smoking  "Status   • Current Some Day Smoker   • Packs/day: 1.00   • Years: 50.00   Smokeless Tobacco   • Never Used       SUBJECTIVE:     Laila Lea is currently smoking 1 pack(s) per day with a 50 pack year history.  Reports no use of alternate forms of tobacco, electronic cigarettes, marijuana or other substances.  Based on the recommendation of the [place] Preventive Services Task Force, this patient is at high risk for lung cancer and a low-dose CT screening scan is recommended.     The patient has had no hemoptysis, unintentional weight loss or increasing shortness of breath. The patient is asymptomatic and has no signs or symptoms of lung cancer.     Together we discussed the potential benefits and potential harms of being screened for lung cancer including the potential for follow up diagnostic testing, risk for over diagnosis, false positive rate and radiation exposure using the AHRQ: Is Lung Cancer Screening Right for Me Decision Aid (Publication 87-[id number], web site www.[facility]q.gov). A copy of this decision aid resource has been provided in the after visit summary.  We also reviewed the patient's smoking history and counseled her on the importance and health benefits of stopping the use of tobacco products.      OBJECTIVE:    /87 (BP Location: Right arm, Patient Position: Sitting, Cuff Size: Adult)  Pulse 88  Resp 16  Ht 170.2 cm (67\")  Wt 70.6 kg (155 lb 9.6 oz)  SpO2 97%  BMI 24.37 kg/m2  General: no distress, alert and oriented  Chest: Lung sounds are clear to auscultation, no wheezes, rales or rhonchi, with symmetric air entry. No respiratory distress  Cardiovascular: RRR with no murmur auscultated      Smoking Cessation discussion:     We discussed that there are a number of resources and interventions to assist with smoking cessation if needed in the future including the 1-800-Quit Now line.(Included in the decision aid shared with the patient today).   On a scale of zero to ten, " the patient rates their motivation to quit at a 1 out of 10 today.  Referral to stop smoking class has been offered. Medication options for tobacco cessation have been discussed with the patient.     Recommendations for continued lung cancer screening:      We discussed the NCCN guidelines for lung cancer screening and the patient verbalized understanding that annual screening is recommended until fifteen years beyond smoking as long as they have no other disease or comorbidity that would prevent them from receiving cancer treatments such as surgery should a lung cancer be detected.  After review of the NCCN guidelines and recommendations for ongoing screening, the patient verbalized understanding of recommendations for follow-up.  The patient has decided to proceed with a Low Dose Lung Cancer Screening CT today.      11 minutes face-to-face spent counseling patient on the continued health benefits of having quit tobacco, maintaining smoking abstinence, smoking cessation strategies and resources, as well as the importance of adherence to annual lung cancer low-dose CT screening.

## 2018-03-01 NOTE — PATIENT INSTRUCTIONS
Please review the decision aid used during our discussion regarding the Low dose lung cancer screening visit today.

## 2018-03-26 RX ORDER — ESCITALOPRAM OXALATE 20 MG/1
TABLET ORAL
Qty: 90 TABLET | Refills: 0 | Status: SHIPPED | OUTPATIENT
Start: 2018-03-26 | End: 2018-07-13 | Stop reason: SDUPTHER

## 2018-04-09 DIAGNOSIS — G89.29 CHRONIC BILATERAL LOW BACK PAIN WITHOUT SCIATICA: ICD-10-CM

## 2018-04-09 DIAGNOSIS — M54.50 CHRONIC BILATERAL LOW BACK PAIN WITHOUT SCIATICA: ICD-10-CM

## 2018-04-09 RX ORDER — TRAMADOL HYDROCHLORIDE 50 MG/1
TABLET ORAL
Qty: 90 TABLET | Refills: 0 | Status: SHIPPED | OUTPATIENT
Start: 2018-04-09 | End: 2018-05-08 | Stop reason: SDUPTHER

## 2018-05-08 ENCOUNTER — TELEPHONE (OUTPATIENT)
Dept: INTERNAL MEDICINE | Facility: CLINIC | Age: 68
End: 2018-05-08

## 2018-05-08 DIAGNOSIS — M54.50 CHRONIC BILATERAL LOW BACK PAIN WITHOUT SCIATICA: ICD-10-CM

## 2018-05-08 DIAGNOSIS — G89.29 CHRONIC BILATERAL LOW BACK PAIN WITHOUT SCIATICA: ICD-10-CM

## 2018-05-08 RX ORDER — TRAMADOL HYDROCHLORIDE 50 MG/1
TABLET ORAL
Qty: 90 TABLET | Refills: 0 | Status: SHIPPED | OUTPATIENT
Start: 2018-05-08 | End: 2018-06-08 | Stop reason: SDUPTHER

## 2018-05-08 NOTE — TELEPHONE ENCOUNTER
Patient's last office visit was on 3/1/18. Patient's next office visit is on 9/24/18. Patient's last refill was on 4/9/18. Please advise.

## 2018-06-05 RX ORDER — ATORVASTATIN CALCIUM 40 MG/1
40 TABLET, FILM COATED ORAL DAILY
Qty: 30 TABLET | Refills: 3 | Status: SHIPPED | OUTPATIENT
Start: 2018-06-05 | End: 2018-10-11 | Stop reason: SDUPTHER

## 2018-06-08 DIAGNOSIS — M54.50 CHRONIC BILATERAL LOW BACK PAIN WITHOUT SCIATICA: ICD-10-CM

## 2018-06-08 DIAGNOSIS — G89.29 CHRONIC BILATERAL LOW BACK PAIN WITHOUT SCIATICA: ICD-10-CM

## 2018-06-08 RX ORDER — TRAMADOL HYDROCHLORIDE 50 MG/1
TABLET ORAL
Qty: 90 TABLET | Refills: 0 | Status: SHIPPED | OUTPATIENT
Start: 2018-06-08 | End: 2018-07-11 | Stop reason: SDUPTHER

## 2018-07-11 DIAGNOSIS — G89.29 CHRONIC BILATERAL LOW BACK PAIN WITHOUT SCIATICA: ICD-10-CM

## 2018-07-11 DIAGNOSIS — M54.50 CHRONIC BILATERAL LOW BACK PAIN WITHOUT SCIATICA: ICD-10-CM

## 2018-07-12 RX ORDER — TRAMADOL HYDROCHLORIDE 50 MG/1
TABLET ORAL
Qty: 90 TABLET | Refills: 0 | Status: SHIPPED | OUTPATIENT
Start: 2018-07-12 | End: 2018-08-13 | Stop reason: SDUPTHER

## 2018-07-13 RX ORDER — ESCITALOPRAM OXALATE 20 MG/1
TABLET ORAL
Qty: 90 TABLET | Refills: 0 | Status: SHIPPED | OUTPATIENT
Start: 2018-07-13 | End: 2018-10-11 | Stop reason: SDUPTHER

## 2018-08-13 DIAGNOSIS — G89.29 CHRONIC BILATERAL LOW BACK PAIN WITHOUT SCIATICA: ICD-10-CM

## 2018-08-13 DIAGNOSIS — M54.50 CHRONIC BILATERAL LOW BACK PAIN WITHOUT SCIATICA: ICD-10-CM

## 2018-08-13 RX ORDER — TRAMADOL HYDROCHLORIDE 50 MG/1
TABLET ORAL
Qty: 90 TABLET | Refills: 0 | Status: SHIPPED | OUTPATIENT
Start: 2018-08-13 | End: 2018-09-24 | Stop reason: SDUPTHER

## 2018-08-14 ENCOUNTER — TELEPHONE (OUTPATIENT)
Dept: INTERNAL MEDICINE | Facility: CLINIC | Age: 68
End: 2018-08-14

## 2018-08-14 RX ORDER — TRAMADOL HYDROCHLORIDE 50 MG/1
TABLET ORAL
Qty: 90 TABLET | Refills: 0 | OUTPATIENT
Start: 2018-08-14

## 2018-08-14 NOTE — TELEPHONE ENCOUNTER
Patient called asking for a refill for meloxicam - this has not been prescribed by this office before.  Please advise.

## 2018-08-15 RX ORDER — MELOXICAM 15 MG/1
15 TABLET ORAL DAILY
Qty: 30 TABLET | Refills: 1 | Status: SHIPPED | OUTPATIENT
Start: 2018-08-15 | End: 2018-09-24 | Stop reason: SDUPTHER

## 2018-09-14 DIAGNOSIS — G89.29 CHRONIC BILATERAL LOW BACK PAIN WITHOUT SCIATICA: ICD-10-CM

## 2018-09-14 DIAGNOSIS — M54.50 CHRONIC BILATERAL LOW BACK PAIN WITHOUT SCIATICA: ICD-10-CM

## 2018-09-14 RX ORDER — TRAMADOL HYDROCHLORIDE 50 MG/1
TABLET ORAL
Qty: 90 TABLET | Refills: 0 | OUTPATIENT
Start: 2018-09-14

## 2018-09-19 ENCOUNTER — TRANSCRIBE ORDERS (OUTPATIENT)
Dept: ADMINISTRATIVE | Facility: HOSPITAL | Age: 68
End: 2018-09-19

## 2018-09-19 DIAGNOSIS — Z13.9 VISIT FOR SCREENING: Primary | ICD-10-CM

## 2018-09-21 ENCOUNTER — HOSPITAL ENCOUNTER (OUTPATIENT)
Dept: MAMMOGRAPHY | Facility: HOSPITAL | Age: 68
Discharge: HOME | End: 2018-09-21
Admitting: FAMILY MEDICINE

## 2018-09-21 DIAGNOSIS — Z13.9 VISIT FOR SCREENING: ICD-10-CM

## 2018-09-21 PROCEDURE — 77067 SCR MAMMO BI INCL CAD: CPT

## 2018-09-24 ENCOUNTER — OFFICE VISIT (OUTPATIENT)
Dept: INTERNAL MEDICINE | Facility: CLINIC | Age: 68
End: 2018-09-24

## 2018-09-24 VITALS
RESPIRATION RATE: 16 BRPM | HEART RATE: 90 BPM | WEIGHT: 143.2 LBS | BODY MASS INDEX: 22.47 KG/M2 | DIASTOLIC BLOOD PRESSURE: 82 MMHG | HEIGHT: 67 IN | SYSTOLIC BLOOD PRESSURE: 140 MMHG | OXYGEN SATURATION: 97 %

## 2018-09-24 DIAGNOSIS — I10 ESSENTIAL HYPERTENSION: Primary | ICD-10-CM

## 2018-09-24 DIAGNOSIS — G89.29 CHRONIC BILATERAL LOW BACK PAIN WITHOUT SCIATICA: ICD-10-CM

## 2018-09-24 DIAGNOSIS — E78.2 MIXED HYPERLIPIDEMIA: ICD-10-CM

## 2018-09-24 DIAGNOSIS — F33.9 EPISODE OF RECURRENT MAJOR DEPRESSIVE DISORDER, UNSPECIFIED DEPRESSION EPISODE SEVERITY (HCC): ICD-10-CM

## 2018-09-24 DIAGNOSIS — M54.50 CHRONIC BILATERAL LOW BACK PAIN WITHOUT SCIATICA: ICD-10-CM

## 2018-09-24 DIAGNOSIS — Z23 NEED FOR INFLUENZA VACCINATION: ICD-10-CM

## 2018-09-24 DIAGNOSIS — Z23 ENCOUNTER FOR IMMUNIZATION: ICD-10-CM

## 2018-09-24 DIAGNOSIS — Z00.00 HEALTH CARE MAINTENANCE: ICD-10-CM

## 2018-09-24 PROCEDURE — 99214 OFFICE O/P EST MOD 30 MIN: CPT | Performed by: FAMILY MEDICINE

## 2018-09-24 PROCEDURE — 90662 IIV NO PRSV INCREASED AG IM: CPT | Performed by: FAMILY MEDICINE

## 2018-09-24 PROCEDURE — G0008 ADMIN INFLUENZA VIRUS VAC: HCPCS | Performed by: FAMILY MEDICINE

## 2018-09-24 RX ORDER — MELOXICAM 15 MG/1
TABLET ORAL
COMMUNITY
Start: 2018-08-14 | End: 2018-09-24

## 2018-09-24 RX ORDER — TRAMADOL HYDROCHLORIDE 50 MG/1
50 TABLET ORAL EVERY 8 HOURS PRN
Qty: 90 TABLET | Refills: 0 | Status: SHIPPED | OUTPATIENT
Start: 2018-09-24 | End: 2018-10-30 | Stop reason: SDUPTHER

## 2018-09-24 NOTE — PROGRESS NOTES
Laila Lea is a 68 y.o. female.      Assessment/Plan   Problem List Items Addressed This Visit        Cardiovascular and Mediastinum    Hyperlipidemia    Relevant Orders    Lipid Panel    Comprehensive Metabolic Panel    Essential hypertension - Primary    Relevant Orders    Comprehensive Metabolic Panel       Nervous and Auditory    Chronic bilateral low back pain without sciatica    Relevant Medications    traMADol (ULTRAM) 50 MG tablet       Other    Depression    Health care maintenance    Relevant Orders    Hepatitis C Antibody    Fluzone High Dose =>65Years (Completed)      Other Visit Diagnoses     Encounter for immunization         Relevant Orders    Fluzone High Dose =>65Years (Completed)    Need for influenza vaccination               Follow-up results of blood work for ongoing management of chronic problems: Blood pressure less than 140/90 heart rate greater than 60  Return in about 6 months (around 3/24/2019), or if symptoms worsen or fail to improve, for Recheck, Next scheduled follow up.      Chief Complaint   Patient presents with   • Follow-up     for hypertension   • Follow-up     for depression   • Follow-up     for back pain, needs tramadol refill     Social History   Substance Use Topics   • Smoking status: Current Some Day Smoker     Packs/day: 1.00     Years: 50.00   • Smokeless tobacco: Never Used   • Alcohol use No      Comment: stopped drinking       History of Present Illness   Patient in for follow-up hypertension depression chronic low back pain and like refills on her tramadol to working well she has no increased need for more frequent dosing her mood is fairly well controlled with the Lexapro.  Continue the same she monitors her blood pressure intermittently and still somewhat 40/90  The following portions of the patient's history were reviewed and updated as appropriate:PMHroutine: Social history , Past Medical History, Surgical history , Allergies, Current Medications, Active  "Problem List and Health Maintenance    Review of Systems   Constitutional: Negative.    HENT: Negative.    Eyes: Negative for visual disturbance.   Respiratory: Negative.    Cardiovascular: Negative.    Gastrointestinal: Negative.    Endocrine: Negative.    Genitourinary: Negative.    Musculoskeletal: Negative.    Allergic/Immunologic: Negative.    Neurological: Negative.    Hematological: Negative.    Psychiatric/Behavioral: Negative.        Objective   Vitals:    09/24/18 1309   BP: 140/82   BP Location: Right arm   Patient Position: Sitting   Cuff Size: Adult   Pulse: 90   Resp: 16   SpO2: 97%   Weight: 65 kg (143 lb 3.2 oz)   Height: 170.2 cm (67\")     Body mass index is 22.43 kg/m².  Physical Exam   Constitutional: She is oriented to person, place, and time. She appears well-developed and well-nourished. No distress.   HENT:   Head: Normocephalic and atraumatic.   Right Ear: External ear normal.   Left Ear: External ear normal.   Mouth/Throat: Oropharynx is clear and moist.   Eyes: Pupils are equal, round, and reactive to light. Conjunctivae and EOM are normal. Right eye exhibits no discharge. Left eye exhibits no discharge. No scleral icterus.   Neck: Normal range of motion. Neck supple. No tracheal deviation present. No thyromegaly present.   Cardiovascular: Normal rate, regular rhythm, normal heart sounds, intact distal pulses and normal pulses.  Exam reveals no gallop.    No murmur heard.  Pulmonary/Chest: Effort normal and breath sounds normal. No respiratory distress. She has no wheezes. She has no rales.   Abdominal: Soft. Bowel sounds are normal. She exhibits no distension. There is no tenderness.   Musculoskeletal: Normal range of motion.   Neurological: She is alert and oriented to person, place, and time. She exhibits normal muscle tone. Coordination normal.   Skin: Skin is warm. No rash noted. No erythema. No pallor.   Psychiatric: She has a normal mood and affect. Her behavior is normal. Judgment " and thought content normal.   Nursing note and vitals reviewed.    Reviewed Data:  No visits with results within 1 Month(s) from this visit.   Latest known visit with results is:   Office Visit on 11/30/2017   Component Date Value Ref Range Status   • Glucose 11/30/2017 92  65 - 99 mg/dL Final   • BUN 11/30/2017 15  8 - 23 mg/dL Final   • Creatinine 11/30/2017 0.88  0.57 - 1.00 mg/dL Final   • eGFR Non  Am 11/30/2017 64  >60 mL/min/1.73 Final   • eGFR African Am 11/30/2017 77  >60 mL/min/1.73 Final   • BUN/Creatinine Ratio 11/30/2017 17.0  7.0 - 25.0 Final   • Sodium 11/30/2017 144  136 - 145 mmol/L Final   • Potassium 11/30/2017 4.2  3.5 - 5.2 mmol/L Final   • Chloride 11/30/2017 102  98 - 107 mmol/L Final   • Total CO2 11/30/2017 27.7  22.0 - 29.0 mmol/L Final   • Calcium 11/30/2017 9.9  8.6 - 10.5 mg/dL Final   • Total Protein 11/30/2017 7.1  6.0 - 8.5 g/dL Final   • Albumin 11/30/2017 4.40  3.50 - 5.20 g/dL Final   • Globulin 11/30/2017 2.7  gm/dL Final   • A/G Ratio 11/30/2017 1.6  g/dL Final   • Total Bilirubin 11/30/2017 0.5  0.1 - 1.2 mg/dL Final   • Alkaline Phosphatase 11/30/2017 70  39 - 117 U/L Final   • AST (SGOT) 11/30/2017 18  1 - 32 U/L Final   • ALT (SGPT) 11/30/2017 12  1 - 33 U/L Final   • Uric Acid 11/30/2017 5.7  2.4 - 5.7 mg/dL Final   • 25 Hydroxy, Vitamin D 11/30/2017 24.3* 30.0 - 100.0 ng/mL Final    Comment: Reference Range for Total Vitamin D 25(OH)  Deficiency    <20.0 ng/mL  Insufficiency 21-29 ng/mL  Sufficiency    ng/mL  Toxicity      >100 ng/ml        • Total Cholesterol 11/30/2017 219* 0 - 200 mg/dL Final   • Triglycerides 11/30/2017 114  0 - 150 mg/dL Final   • HDL Cholesterol 11/30/2017 54  40 - 60 mg/dL Final   • VLDL Cholesterol 11/30/2017 22.8  5 - 40 mg/dL Final   • LDL Cholesterol  11/30/2017 142* 0 - 100 mg/dL Final

## 2018-09-25 RX ORDER — TRAMADOL HYDROCHLORIDE 50 MG/1
TABLET ORAL
Qty: 90 TABLET | Refills: 0 | OUTPATIENT
Start: 2018-09-25

## 2018-09-26 ENCOUNTER — TELEPHONE (OUTPATIENT)
Dept: INTERNAL MEDICINE | Facility: CLINIC | Age: 68
End: 2018-09-26

## 2018-09-26 LAB
ALBUMIN SERPL-MCNC: 5 G/DL (ref 3.5–5.2)
ALBUMIN/GLOB SERPL: 2 G/DL
ALP SERPL-CCNC: 74 U/L (ref 39–117)
ALT SERPL-CCNC: 12 U/L (ref 1–33)
AST SERPL-CCNC: 19 U/L (ref 1–32)
BILIRUB SERPL-MCNC: 0.6 MG/DL (ref 0.1–1.2)
BUN SERPL-MCNC: 16 MG/DL (ref 8–23)
BUN/CREAT SERPL: 13.7 (ref 7–25)
CALCIUM SERPL-MCNC: 10.6 MG/DL (ref 8.6–10.5)
CHLORIDE SERPL-SCNC: 102 MMOL/L (ref 98–107)
CHOLEST SERPL-MCNC: 151 MG/DL (ref 0–200)
CO2 SERPL-SCNC: 28 MMOL/L (ref 22–29)
CREAT SERPL-MCNC: 1.17 MG/DL (ref 0.57–1)
GLOBULIN SER CALC-MCNC: 2.5 GM/DL
GLUCOSE SERPL-MCNC: 96 MG/DL (ref 65–99)
HCV AB S/CO SERPL IA: <0.1 S/CO RATIO (ref 0–0.9)
HDLC SERPL-MCNC: 49 MG/DL (ref 40–60)
LDLC SERPL CALC-MCNC: 78 MG/DL (ref 0–100)
POTASSIUM SERPL-SCNC: 4.3 MMOL/L (ref 3.5–5.2)
PROT SERPL-MCNC: 7.5 G/DL (ref 6–8.5)
SODIUM SERPL-SCNC: 143 MMOL/L (ref 136–145)
TRIGL SERPL-MCNC: 120 MG/DL (ref 0–150)
VLDLC SERPL CALC-MCNC: 24 MG/DL (ref 5–40)

## 2018-09-26 NOTE — TELEPHONE ENCOUNTER
----- Message from James Bourgeois MD sent at 9/25/2018  4:37 PM EDT -----  Mammogram stable, and repeat one year

## 2018-09-27 ENCOUNTER — TELEPHONE (OUTPATIENT)
Dept: INTERNAL MEDICINE | Facility: CLINIC | Age: 68
End: 2018-09-27

## 2018-10-11 RX ORDER — ATORVASTATIN CALCIUM 40 MG/1
40 TABLET, FILM COATED ORAL DAILY
Qty: 30 TABLET | Refills: 0 | Status: SHIPPED | OUTPATIENT
Start: 2018-10-11 | End: 2018-11-19 | Stop reason: SDUPTHER

## 2018-10-11 RX ORDER — ESCITALOPRAM OXALATE 20 MG/1
TABLET ORAL
Qty: 90 TABLET | Refills: 0 | Status: SHIPPED | OUTPATIENT
Start: 2018-10-11 | End: 2019-04-01 | Stop reason: ALTCHOICE

## 2018-10-30 DIAGNOSIS — G89.29 CHRONIC BILATERAL LOW BACK PAIN WITHOUT SCIATICA: ICD-10-CM

## 2018-10-30 DIAGNOSIS — M54.50 CHRONIC BILATERAL LOW BACK PAIN WITHOUT SCIATICA: ICD-10-CM

## 2018-10-30 RX ORDER — TRAMADOL HYDROCHLORIDE 50 MG/1
TABLET ORAL
Qty: 90 TABLET | Refills: 0 | Status: SHIPPED | OUTPATIENT
Start: 2018-10-30 | End: 2018-12-20 | Stop reason: SDUPTHER

## 2018-11-19 RX ORDER — ATORVASTATIN CALCIUM 40 MG/1
40 TABLET, FILM COATED ORAL DAILY
Qty: 30 TABLET | Refills: 2 | Status: SHIPPED | OUTPATIENT
Start: 2018-11-19 | End: 2019-03-13 | Stop reason: SDUPTHER

## 2018-12-03 ENCOUNTER — CLINICAL SUPPORT (OUTPATIENT)
Dept: INTERNAL MEDICINE | Facility: CLINIC | Age: 68
End: 2018-12-03

## 2018-12-03 DIAGNOSIS — Z23 NEED FOR VACCINATION FOR PNEUMOCOCCUS: Primary | ICD-10-CM

## 2018-12-03 PROCEDURE — 90732 PPSV23 VACC 2 YRS+ SUBQ/IM: CPT | Performed by: FAMILY MEDICINE

## 2018-12-03 PROCEDURE — G0009 ADMIN PNEUMOCOCCAL VACCINE: HCPCS | Performed by: FAMILY MEDICINE

## 2018-12-20 DIAGNOSIS — M54.50 CHRONIC BILATERAL LOW BACK PAIN WITHOUT SCIATICA: ICD-10-CM

## 2018-12-20 DIAGNOSIS — G89.29 CHRONIC BILATERAL LOW BACK PAIN WITHOUT SCIATICA: ICD-10-CM

## 2018-12-20 RX ORDER — TRAMADOL HYDROCHLORIDE 50 MG/1
TABLET ORAL
Qty: 90 TABLET | Refills: 0 | Status: SHIPPED | OUTPATIENT
Start: 2018-12-20 | End: 2019-01-30 | Stop reason: SDUPTHER

## 2019-01-07 RX ORDER — LISINOPRIL 30 MG/1
30 TABLET ORAL DAILY
Qty: 90 TABLET | Refills: 0 | Status: SHIPPED | OUTPATIENT
Start: 2019-01-07 | End: 2019-04-16 | Stop reason: SDUPTHER

## 2019-01-30 DIAGNOSIS — G89.29 CHRONIC BILATERAL LOW BACK PAIN WITHOUT SCIATICA: ICD-10-CM

## 2019-01-30 DIAGNOSIS — M54.50 CHRONIC BILATERAL LOW BACK PAIN WITHOUT SCIATICA: ICD-10-CM

## 2019-01-31 RX ORDER — TRAMADOL HYDROCHLORIDE 50 MG/1
TABLET ORAL
Qty: 90 TABLET | Refills: 0 | Status: SHIPPED | OUTPATIENT
Start: 2019-01-31 | End: 2019-05-20 | Stop reason: SDUPTHER

## 2019-03-11 DIAGNOSIS — M54.50 CHRONIC BILATERAL LOW BACK PAIN WITHOUT SCIATICA: ICD-10-CM

## 2019-03-11 DIAGNOSIS — G89.29 CHRONIC BILATERAL LOW BACK PAIN WITHOUT SCIATICA: ICD-10-CM

## 2019-03-11 RX ORDER — TRAMADOL HYDROCHLORIDE 50 MG/1
TABLET ORAL
Qty: 90 TABLET | Refills: 0 | OUTPATIENT
Start: 2019-03-11

## 2019-03-11 RX ORDER — ATORVASTATIN CALCIUM 40 MG/1
40 TABLET, FILM COATED ORAL DAILY
Qty: 30 TABLET | Refills: 0 | OUTPATIENT
Start: 2019-03-11

## 2019-03-13 ENCOUNTER — TELEPHONE (OUTPATIENT)
Dept: INTERNAL MEDICINE | Facility: CLINIC | Age: 69
End: 2019-03-13

## 2019-03-13 DIAGNOSIS — G89.29 CHRONIC BILATERAL LOW BACK PAIN WITHOUT SCIATICA: ICD-10-CM

## 2019-03-13 DIAGNOSIS — M54.50 CHRONIC BILATERAL LOW BACK PAIN WITHOUT SCIATICA: ICD-10-CM

## 2019-03-13 RX ORDER — ATORVASTATIN CALCIUM 40 MG/1
40 TABLET, FILM COATED ORAL DAILY
Qty: 30 TABLET | Refills: 0 | OUTPATIENT
Start: 2019-03-13

## 2019-03-13 RX ORDER — TRAMADOL HYDROCHLORIDE 50 MG/1
50 TABLET ORAL EVERY 8 HOURS PRN
Qty: 90 TABLET | Refills: 0 | Status: CANCELLED | OUTPATIENT
Start: 2019-03-13

## 2019-03-13 RX ORDER — ATORVASTATIN CALCIUM 40 MG/1
40 TABLET, FILM COATED ORAL DAILY
Qty: 30 TABLET | Refills: 2 | Status: SHIPPED | OUTPATIENT
Start: 2019-03-13 | End: 2019-06-09 | Stop reason: SDUPTHER

## 2019-04-01 ENCOUNTER — OFFICE VISIT (OUTPATIENT)
Dept: INTERNAL MEDICINE | Facility: CLINIC | Age: 69
End: 2019-04-01

## 2019-04-01 VITALS
HEART RATE: 90 BPM | WEIGHT: 148 LBS | DIASTOLIC BLOOD PRESSURE: 84 MMHG | OXYGEN SATURATION: 96 % | SYSTOLIC BLOOD PRESSURE: 130 MMHG | TEMPERATURE: 98.5 F | BODY MASS INDEX: 23.18 KG/M2

## 2019-04-01 DIAGNOSIS — R93.7 ABNORMAL FINDINGS ON DIAGNOSTIC IMAGING OF OTHER PARTS OF MUSCULOSKELETAL SYSTEM: ICD-10-CM

## 2019-04-01 DIAGNOSIS — F17.200 TOBACCO USE DISORDER: ICD-10-CM

## 2019-04-01 DIAGNOSIS — M85.80 OSTEOPENIA, UNSPECIFIED LOCATION: ICD-10-CM

## 2019-04-01 DIAGNOSIS — Z78.0 MENOPAUSE: ICD-10-CM

## 2019-04-01 DIAGNOSIS — G89.29 CHRONIC BILATERAL LOW BACK PAIN WITHOUT SCIATICA: ICD-10-CM

## 2019-04-01 DIAGNOSIS — Z00.00 MEDICARE ANNUAL WELLNESS VISIT, SUBSEQUENT: Primary | ICD-10-CM

## 2019-04-01 DIAGNOSIS — Z87.891 PERSONAL HISTORY OF NICOTINE DEPENDENCE: ICD-10-CM

## 2019-04-01 DIAGNOSIS — E78.2 MIXED HYPERLIPIDEMIA: ICD-10-CM

## 2019-04-01 DIAGNOSIS — M54.50 CHRONIC BILATERAL LOW BACK PAIN WITHOUT SCIATICA: ICD-10-CM

## 2019-04-01 DIAGNOSIS — I10 ESSENTIAL HYPERTENSION: ICD-10-CM

## 2019-04-01 PROCEDURE — 99407 BEHAV CHNG SMOKING > 10 MIN: CPT | Performed by: FAMILY MEDICINE

## 2019-04-01 PROCEDURE — 99214 OFFICE O/P EST MOD 30 MIN: CPT | Performed by: FAMILY MEDICINE

## 2019-04-01 PROCEDURE — G0296 VISIT TO DETERM LDCT ELIG: HCPCS | Performed by: FAMILY MEDICINE

## 2019-04-01 PROCEDURE — G0439 PPPS, SUBSEQ VISIT: HCPCS | Performed by: FAMILY MEDICINE

## 2019-04-01 PROCEDURE — 96160 PT-FOCUSED HLTH RISK ASSMT: CPT | Performed by: FAMILY MEDICINE

## 2019-04-01 RX ORDER — DULOXETIN HYDROCHLORIDE 60 MG/1
CAPSULE, DELAYED RELEASE ORAL
Refills: 1 | COMMUNITY
Start: 2019-03-11 | End: 2020-11-04

## 2019-04-01 RX ORDER — DULOXETIN HYDROCHLORIDE 30 MG/1
CAPSULE, DELAYED RELEASE ORAL
Refills: 1 | COMMUNITY
Start: 2019-03-11 | End: 2019-10-01

## 2019-04-01 NOTE — PROGRESS NOTES
Laila Lea is a 68 y.o. female.      Assessment/Plan   Problem List Items Addressed This Visit        Cardiovascular and Mediastinum    Hyperlipidemia    Essential hypertension    Relevant Orders    Comprehensive Metabolic Panel       Nervous and Auditory    Chronic bilateral low back pain without sciatica       Musculoskeletal and Integument    Osteopenia    Relevant Orders    DEXA Bone Density Axial       Genitourinary    Menopause       Other    Tobacco use disorder    Overview     Since age 17  1ppd         Relevant Orders    CT Chest Low Dose Wo    Medicare annual wellness visit, subsequent - Primary    Relevant Orders    CT Chest Low Dose Wo      Other Visit Diagnoses     Abnormal findings on diagnostic imaging of other parts of musculoskeletal system         Relevant Orders    DEXA Bone Density Axial    Personal history of nicotine dependence         Relevant Orders    CT Chest Low Dose Wo           Follow-up results of blood work and imaging for ongoing management of chronic problems otherwise as needed or  Return in about 6 months (around 10/1/2019), or if symptoms worsen or fail to improve, for Recheck, Next scheduled follow up.      Chief Complaint   Patient presents with   • follow up to hypertension   • follow up to back pain   • Medicare Wellness-subsequent     Social History     Tobacco Use   • Smoking status: Current Some Day Smoker     Packs/day: 1.00     Years: 50.00     Pack years: 50.00   • Smokeless tobacco: Never Used   Substance Use Topics   • Alcohol use: No     Comment: stopped drinking   • Drug use: Not on file       History of Present Illness   Patient follows up for chronic problems of hypertension back pain tobacco abuse osteoporosis she is been doing fairly well she is changed her depression medicine from Celexa to Cymbalta and is doing well and she feels very good she has less aches and pains she did not have screening test done last year would like to reconsider the  those.  She has been smoking for 53 years pack a day multiple times in quitting in the past and finds it difficult she would like to have lung cancer screening she thinks this would be helpful for motivating her to quit smoking she is recently had a friend diagnosed with lung cancer 12 minutes was spent face-to-face with patient discussing smoking cessation and techniques as well as benefits    The following portions of the patient's history were reviewed and updated as appropriate:PMHroutine: Social history , Allergies, Current Medications, Active Problem List and Health Maintenance    Review of Systems   Constitutional: Negative for activity change, appetite change and unexpected weight change.   HENT: Negative for nosebleeds and trouble swallowing.    Eyes: Negative for pain and visual disturbance.   Respiratory: Negative for chest tightness, shortness of breath and wheezing.    Cardiovascular: Negative for chest pain and palpitations.   Gastrointestinal: Negative for abdominal pain and blood in stool.   Endocrine: Negative.    Genitourinary: Negative for difficulty urinating and hematuria.   Musculoskeletal: Negative for joint swelling.   Skin: Negative for color change and rash.   Allergic/Immunologic: Negative.    Neurological: Negative for syncope and speech difficulty.   Hematological: Negative for adenopathy.   Psychiatric/Behavioral: Negative for agitation and confusion.   All other systems reviewed and are negative.      Objective   Vitals:    04/01/19 1427   BP: 130/84   BP Location: Right arm   Patient Position: Sitting   Cuff Size: Adult   Pulse: 90   Temp: 98.5 °F (36.9 °C)   TempSrc: Oral   SpO2: 96%   Weight: 67.1 kg (148 lb)     Body mass index is 23.18 kg/m².  Physical Exam   Constitutional: She is oriented to person, place, and time. She appears well-developed and well-nourished. No distress.   HENT:   Head: Normocephalic and atraumatic.   Eyes: Conjunctivae and EOM are normal. Pupils are equal,  round, and reactive to light. Right eye exhibits no discharge. Left eye exhibits no discharge. No scleral icterus.   Neck: Normal range of motion. Neck supple. No tracheal deviation present. No thyromegaly present.   Cardiovascular: Normal rate, regular rhythm, normal heart sounds, intact distal pulses and normal pulses. Exam reveals no gallop.   No murmur heard.  Pulmonary/Chest: Effort normal and breath sounds normal. No respiratory distress. She has no wheezes. She has no rales.   Musculoskeletal: Normal range of motion.   Neurological: She is alert and oriented to person, place, and time. She exhibits normal muscle tone. Coordination normal.   Skin: Skin is warm. No rash noted. No erythema. No pallor.   Psychiatric: She has a normal mood and affect. Her behavior is normal. Judgment and thought content normal.   Nursing note and vitals reviewed.    Reviewed Data:  No visits with results within 1 Month(s) from this visit.   Latest known visit with results is:   Office Visit on 09/24/2018   Component Date Value Ref Range Status   • Hep C Virus Ab 09/24/2018 <0.1  0.0 - 0.9 s/co ratio Final    Comment:                                   Negative:     < 0.8                               Indeterminate: 0.8 - 0.9                                    Positive:     > 0.9   The CDC recommends that a positive HCV antibody result   be followed up with a HCV Nucleic Acid Amplification   test ([id number]).     • Total Cholesterol 09/24/2018 151  0 - 200 mg/dL Final   • Triglycerides 09/24/2018 120  0 - 150 mg/dL Final   • HDL Cholesterol 09/24/2018 49  40 - 60 mg/dL Final   • VLDL Cholesterol 09/24/2018 24  5 - 40 mg/dL Final   • LDL Cholesterol  09/24/2018 78  0 - 100 mg/dL Final   • Glucose 09/24/2018 96  65 - 99 mg/dL Final   • BUN 09/24/2018 16  8 - 23 mg/dL Final   • Creatinine 09/24/2018 1.17* 0.57 - 1.00 mg/dL Final   • eGFR Non  Am 09/24/2018 46* >60 mL/min/1.73 Final   • eGFR African Am 09/24/2018 56* >60 mL/min/1.73  Final   • BUN/Creatinine Ratio 09/24/2018 13.7  7.0 - 25.0 Final   • Sodium 09/24/2018 143  136 - 145 mmol/L Final   • Potassium 09/24/2018 4.3  3.5 - 5.2 mmol/L Final   • Chloride 09/24/2018 102  98 - 107 mmol/L Final   • Total CO2 09/24/2018 28.0  22.0 - 29.0 mmol/L Final   • Calcium 09/24/2018 10.6* 8.6 - 10.5 mg/dL Final   • Total Protein 09/24/2018 7.5  6.0 - 8.5 g/dL Final   • Albumin 09/24/2018 5.00  3.50 - 5.20 g/dL Final   • Globulin 09/24/2018 2.5  gm/dL Final   • A/G Ratio 09/24/2018 2.0  g/dL Final   • Total Bilirubin 09/24/2018 0.6  0.1 - 1.2 mg/dL Final   • Alkaline Phosphatase 09/24/2018 74  39 - 117 U/L Final   • AST (SGOT) 09/24/2018 19  1 - 32 U/L Final   • ALT (SGPT) 09/24/2018 12  1 - 33 U/L Final

## 2019-04-01 NOTE — PROGRESS NOTES
Subsequent Medicare Wellness Visit   The ABC's of the Annual Wellness Visit    Chief Complaint   Patient presents with   • follow up to hypertension   • follow up to back pain   • Medicare Wellness-subsequent       HPI:  JACQUI Fowler-1950, is a 68 y.o. female who presents for a Subsequent Medicare Wellness Visit.    Recent Hospitalizations:  No hospitalization(s) within the last year..    Current Medical Providers:  Patient Care Team:  James Bourgeois MD as PCP - General  James Bourgeois MD as PCP - Family Medicine    Health Habits and Functional and Cognitive Screening and Depression Screening:  Functional & Cognitive Status 2019   Do you have difficulty preparing food and eating? No   Do you have difficulty bathing yourself, getting dressed or grooming yourself? No   Do you have difficulty using the toilet? No   Do you have difficulty moving around from place to place? No   Do you have trouble with steps or getting out of a bed or a chair? No   In the past year have you fallen or experienced a near fall? No   Current Diet Limited Junk Food   Dental Exam Up to date   Eye Exam Up to date   Exercise (times per week) 7 times per week   Current Exercise Activities Include Housecleaning   Do you need help using the phone?  No   Are you deaf or do you have serious difficulty hearing?  No   Do you need help with transportation? No   Do you need help shopping? No   Do you need help preparing meals?  No   Do you need help with housework?  No   Do you need help with laundry? No   Do you need help taking your medications? No   Do you need help managing money? No   Do you ever drive or ride in a car without wearing a seat belt? No   Have you felt unusual stress, anger or loneliness in the last month? No   Who do you live with? Alone   If you need help, do you have trouble finding someone available to you? Yes   Have you been bothered in the last four weeks by sexual problems? No   Do you have difficulty  concentrating, remembering or making decisions? Yes       Compared to one year ago, the patient feels her physical health is better and her mental health is better.    Depression Screen:  PHQ-2/PHQ-9 Depression Screening 4/1/2019   Little interest or pleasure in doing things 2   Feeling down, depressed, or hopeless 2   Trouble falling or staying asleep, or sleeping too much 1   Feeling tired or having little energy 3   Poor appetite or overeating 3   Feeling bad about yourself - or that you are a failure or have let yourself or your family down 1   Trouble concentrating on things, such as reading the newspaper or watching television 1   Moving or speaking so slowly that other people could have noticed. Or the opposite - being so fidgety or restless that you have been moving around a lot more than usual 2   Thoughts that you would be better off dead, or of hurting yourself in some way 0   Total Score 15   If you checked off any problems, how difficult have these problems made it for you to do your work, take care of things at home, or get along with other people? Somewhat difficult         Past Medical/Family/Social History:  The following portions of the patient's history were reviewed and updated as appropriate: allergies, current medications, past family history, past medical history, past social history, past surgical history and problem list.    Allergies   Allergen Reactions   • Simvastatin Myalgia   • Venlafaxine          Current Outpatient Medications:   •  acetaminophen (TYLENOL) 500 MG tablet, Take 1,000 mg by mouth Daily As Needed., Disp: , Rfl:   •  aspirin 325 MG tablet, Take 325 mg by mouth Daily., Disp: , Rfl:   •  atorvastatin (LIPITOR) 40 MG tablet, Take 1 tablet by mouth Daily., Disp: 30 tablet, Rfl: 2  •  cetirizine (ZyrTEC) 10 MG tablet, Take 10 mg by mouth Daily As Needed., Disp: , Rfl:   •  DULoxetine (CYMBALTA) 30 MG capsule, take 1 BID, Disp: , Rfl: 1  •  DULoxetine (CYMBALTA) 60 MG capsule,  TK 1 C PO QAM, Disp: , Rfl: 1  •  lisinopril (PRINIVIL,ZESTRIL) 30 MG tablet, TAKE 1 TABLET BY MOUTH DAILY, Disp: 90 tablet, Rfl: 0  •  traMADol (ULTRAM) 50 MG tablet, TAKE 1 TABLET BY MOUTH EVERY 8 HOURS AS NEEDED FOR PAIN, Disp: 90 tablet, Rfl: 0    Aspirin use counseling: Taking ASA appropriately as indicated    Current medication list contains high risk medications. No harmful drug interactions have been identified.  Plan of action monitor    Family History   Problem Relation Age of Onset   • Hodgkin's lymphoma Mother         hodgkin disease   • Breast cancer Mother    • Dementia Father    • Stroke Father    • Stroke Brother    • Heart attack Brother    • Heart disease Brother    • Heart attack Son    • Colon cancer Maternal Aunt        Social History     Tobacco Use   • Smoking status: Current Some Day Smoker     Packs/day: 1.00     Years: 50.00     Pack years: 50.00   • Smokeless tobacco: Never Used   Substance Use Topics   • Alcohol use: No     Comment: stopped drinking     [facility] Low-Dose Lung Cancer CT Screening     Laila Lea is a 68 y.o.  female with whom I discussed Low-Dose Lung Cancer Screening today.     SMOKING HISTORY:   Social History     Tobacco Use   Smoking Status Current Some Day Smoker   • Packs/day: 1.00   • Years: 50.00   • Pack years: 50.00   Smokeless Tobacco Never Used       Laila Lea is currently smoking 1 pack(s) per day with a 53pack year history.  she reports no use of alternate forms of tobacco, electronic cigarettes, marijuana or other substances.  Based on the recommendation of the [place] Preventive Services Task Force, this patient is at high risk for lung cancer and a low-dose CT screening scan is recommended.     We discussed the connection between Radon and Lung Cancer and the availability of free test kits. Ms. Lea has had  second-hand smoke exposure as a child with smoking in their home.    The patient has had no hemoptysis,  unintentional weight loss or increasing shortness of breath. The patient is asymptomatic and has no signs or symptoms of lung cancer.     Together we discussed the potential benefits and potential harms of being screened for lung cancer including the potential for follow up diagnostic testing, risk for over diagnosis, false positive rate and radiation exposure using the [facility] standardized decision aid. We reviewed the patient's smoking history and counseled on the importance and health benefits of maintaining cigarette smoking abstinence.      Smoking Abstinence  DISCUSSION HELD TODAY:     We discussed that there are a number of resources and interventions to assist with smoking cessation if needed in the future including the 1-800-Quit Now line, Health Department programs, [place] [facility] resources, and use of the [place] Department of Health and Human Services five keys for quitting and quit plan worksheet.  On a scale of zero to ten, the patient rates their motivation to quit at a 8out of 10 today.  The patient is confident that they will never smoke in the future.    Recommendations for continued lung cancer screening:      We discussed the NCCN guidelines for lung cancer screening and the patient verbalized understanding that annual screening is recommended until fifteen years beyond smoking as long as they have no other disease or comorbidity that would prevent them from receiving cancer treatments such as surgery should a lung cancer be detected. The [facility] Lung Cancer Screening Shared Decision-Making Tool was utilized as an aid in discussing whether or not screening was right. The patient has decided to proceed with a Low Dose Lung Cancer Screening CT today. The patient is aware that the results of his screening will be shared with the referring provider or PCP as well.       The patient verbalizes understanding that results of this low dose lung CT exam will be called to  her and that assistance will be provided in arranging any necessary follow-up.    After review of the NCCN guidelines and recommendations for ongoing screening, the patient verbalized understanding of recommendations for follow-up. We discussed the importance of continued annual screening until 15 years beyond smoking or until other life-limiting comorbidities are present. We discussed the impact of comorbidities and ability and willing to undergo diagnosis and treatment.        Past Surgical History:   Procedure Laterality Date   • AUGMENTATION MAMMAPLASTY Bilateral 2004   • BREAST BIOPSY Left 2000    cancer   • BREAST CYST ASPIRATION Left 1990's   • BREAST CYST EXCISION Left 2000   • BREAST RECONSTRUCTION     • CHOLECYSTECTOMY     • MASTECTOMY Left 2000   • REDUCTION MAMMAPLASTY Right 2004       Patient Active Problem List   Diagnosis   • Osteopenia   • Tension headache   • Vitamin D deficiency   • Benign neoplasm of adrenal cortex   • Adrenal gland neoplasm   • Claudication (CMS/HCC)   • Cervical radiculopathy   • Cyst of finger   • Depression   • Eczema   • Serum creatinine raised   • Fatigue   • Hyperlipidemia   • Essential hypertension   • Insomnia   • Lumbar radiculopathy   • Sciatica   • Tachycardia   • Chronic bilateral low back pain without sciatica   • Hyperuricemia   • Cerebrovascular accident (CVA) due to occlusion of left posterior cerebral artery (CMS/HCC)   • Nicotine dependence   • Tobacco use disorder   • Health care maintenance   • Medicare annual wellness visit, subsequent   • Menopause       Review of Systems    Objective     Vitals:    04/01/19 1427   BP: 130/84   BP Location: Right arm   Patient Position: Sitting   Cuff Size: Adult   Pulse: 90   Temp: 98.5 °F (36.9 °C)   TempSrc: Oral   SpO2: 96%   Weight: 67.1 kg (148 lb)   PainSc:   8       Patient's Body mass index is 23.18 kg/m². BMI is within normal parameters. No follow-up required..      No exam data present    The patient has no  evidence of cognitve impairment.     Physical Exam    Recent Lab Results:  Lab Results   Component Value Date    GLU 96 09/24/2018     Lab Results   Component Value Date    TRIG 120 09/24/2018    HDL 49 09/24/2018    VLDL 24 09/24/2018    LDLHDL 3.2 07/14/2015       Assessment/Plan   Age-appropriate Screening Schedule:  Refer to the list below for future screening recommendations based on patient's age, sex and/or medical conditions.      Health Maintenance   Topic Date Due   • ZOSTER VACCINE (1 of 2) 08/01/1999   • DXA SCAN  07/19/2016   • INFLUENZA VACCINE  08/01/2019   • LIPID PANEL  09/24/2019   • COLONOSCOPY  01/01/2022   • PNEUMOCOCCAL VACCINES (65+ LOW/MEDIUM RISK)  Completed   • TDAP/TD VACCINES  Discontinued       Medicare Risks and Personalized Health Plan:  Osteoprorosis risk identified;  Bone Densiometry ordered  Cardiovascular risk;  Patient advised to follow heart healthy diet to help decrease cardiovascular risk   Tobacco Use/Dependance;  I advised Laila of the risks of continuing to use tobacco, and I provided her with tobacco cessation educational materials in the After Visit Summary.     During this visit, I spent 12 minutes counseling the patient regarding tobacco cessation.  Lung Cancer risk elevated;   Patient advised to quit smoking  Patient advised to make appointment for low dose lung cancer screening  Patient reminded to keep yearly schedule for low dose CT lung cancer screening   Low Dose Lung CT Scan ordered  Inactivity/Poor Exercise Habits-patient reported; Inactivity; Follow Exercise Information (additional patient information in the After Visit Summary)      CMS-Preventive Services Quick Reference  Medicare Preventive Services Addressed:  Bone Density Measurements    Advance Care Planning:  Patient has an advance directive - a copy has not been provided. Have asked the patient to send this to us to add to record  Ivan Babb, kylah  Diagnoses and all orders for this visit:    1.  Medicare annual wellness visit, subsequent (Primary)  -     CT Chest Low Dose Wo; Future    2. Essential hypertension  -     Comprehensive Metabolic Panel    3. Chronic bilateral low back pain without sciatica    4. Osteopenia, unspecified location  -     DEXA Bone Density Axial; Future    5. Menopause    6. Abnormal findings on diagnostic imaging of other parts of musculoskeletal system   -     DEXA Bone Density Axial; Future    7. Tobacco use disorder  -     CT Chest Low Dose Wo; Future    8. Personal history of nicotine dependence   -     CT Chest Low Dose Wo; Future    9. Mixed hyperlipidemia        An After Visit Summary and PPPS with all of these plans were given to the patient.      Follow Up:  Return in about 6 months (around 10/1/2019), or if symptoms worsen or fail to improve, for Recheck, Next scheduled follow up.

## 2019-04-02 LAB
ALBUMIN SERPL-MCNC: 4.5 G/DL (ref 3.5–5.2)
ALBUMIN/GLOB SERPL: 1.9 G/DL
ALP SERPL-CCNC: 72 U/L (ref 39–117)
ALT SERPL-CCNC: 11 U/L (ref 1–33)
AST SERPL-CCNC: 17 U/L (ref 1–32)
BILIRUB SERPL-MCNC: 0.3 MG/DL (ref 0.2–1.2)
BUN SERPL-MCNC: 18 MG/DL (ref 8–23)
BUN/CREAT SERPL: 20.9 (ref 7–25)
CALCIUM SERPL-MCNC: 9.9 MG/DL (ref 8.6–10.5)
CHLORIDE SERPL-SCNC: 106 MMOL/L (ref 98–107)
CO2 SERPL-SCNC: 27.4 MMOL/L (ref 22–29)
CREAT SERPL-MCNC: 0.86 MG/DL (ref 0.57–1)
GLOBULIN SER CALC-MCNC: 2.4 GM/DL
GLUCOSE SERPL-MCNC: 82 MG/DL (ref 65–99)
POTASSIUM SERPL-SCNC: 4.4 MMOL/L (ref 3.5–5.2)
PROT SERPL-MCNC: 6.9 G/DL (ref 6–8.5)
SODIUM SERPL-SCNC: 144 MMOL/L (ref 136–145)

## 2019-04-04 ENCOUNTER — TELEPHONE (OUTPATIENT)
Dept: INTERNAL MEDICINE | Facility: CLINIC | Age: 69
End: 2019-04-04

## 2019-04-16 RX ORDER — LISINOPRIL 30 MG/1
TABLET ORAL
Qty: 90 TABLET | Refills: 0 | Status: SHIPPED | OUTPATIENT
Start: 2019-04-16 | End: 2019-07-12 | Stop reason: SDUPTHER

## 2019-05-20 DIAGNOSIS — M54.50 CHRONIC BILATERAL LOW BACK PAIN WITHOUT SCIATICA: ICD-10-CM

## 2019-05-20 DIAGNOSIS — G89.29 CHRONIC BILATERAL LOW BACK PAIN WITHOUT SCIATICA: ICD-10-CM

## 2019-05-20 RX ORDER — TRAMADOL HYDROCHLORIDE 50 MG/1
TABLET ORAL
Qty: 90 TABLET | Refills: 0 | Status: SHIPPED | OUTPATIENT
Start: 2019-05-20 | End: 2019-07-25 | Stop reason: SDUPTHER

## 2019-05-28 ENCOUNTER — HOSPITAL ENCOUNTER (OUTPATIENT)
Dept: BONE DENSITY | Facility: HOSPITAL | Age: 69
Discharge: HOME | End: 2019-05-28

## 2019-05-28 ENCOUNTER — HOSPITAL ENCOUNTER (OUTPATIENT)
Dept: CT IMAGING | Facility: HOSPITAL | Age: 69
Discharge: HOME | End: 2019-05-28
Admitting: FAMILY MEDICINE

## 2019-05-28 DIAGNOSIS — Z00.00 MEDICARE ANNUAL WELLNESS VISIT, SUBSEQUENT: ICD-10-CM

## 2019-05-28 DIAGNOSIS — M85.80 OSTEOPENIA, UNSPECIFIED LOCATION: ICD-10-CM

## 2019-05-28 DIAGNOSIS — Z87.891 PERSONAL HISTORY OF NICOTINE DEPENDENCE: ICD-10-CM

## 2019-05-28 DIAGNOSIS — F17.200 TOBACCO USE DISORDER: ICD-10-CM

## 2019-05-28 DIAGNOSIS — R93.7 ABNORMAL FINDINGS ON DIAGNOSTIC IMAGING OF OTHER PARTS OF MUSCULOSKELETAL SYSTEM: ICD-10-CM

## 2019-05-28 PROCEDURE — G0297 LDCT FOR LUNG CA SCREEN: HCPCS

## 2019-05-28 PROCEDURE — 77080 DXA BONE DENSITY AXIAL: CPT

## 2019-06-05 ENCOUNTER — TELEPHONE (OUTPATIENT)
Dept: INTERNAL MEDICINE | Facility: CLINIC | Age: 69
End: 2019-06-05

## 2019-06-06 ENCOUNTER — TELEPHONE (OUTPATIENT)
Dept: INTERNAL MEDICINE | Facility: CLINIC | Age: 69
End: 2019-06-06

## 2019-06-06 DIAGNOSIS — M54.9 BACK PAIN, UNSPECIFIED BACK LOCATION, UNSPECIFIED BACK PAIN LATERALITY, UNSPECIFIED CHRONICITY: Primary | ICD-10-CM

## 2019-06-06 DIAGNOSIS — M25.559 ARTHRALGIA OF HIP, UNSPECIFIED LATERALITY: ICD-10-CM

## 2019-06-06 NOTE — TELEPHONE ENCOUNTER
Patient called stating that she would like to speak with Dr. Bourgeois about her bone scan results.  She would like to discuss seeing a specialist for the pain in her back and hip that she is having.  Please advise.

## 2019-06-06 NOTE — TELEPHONE ENCOUNTER
Patient has thinning of the bones on bone density without any evidence of osteoporosis recommend calcium plus D over-the-counter 2 pills daily as well as regular weightbearing exercises repeat bone density 2 years

## 2019-06-07 NOTE — TELEPHONE ENCOUNTER
LMA - patient notified.  Patient notified that Dr. Bourgeois would like to refer her sports medicine.  Referral put in [facility].

## 2019-06-10 RX ORDER — ATORVASTATIN CALCIUM 40 MG/1
40 TABLET, FILM COATED ORAL DAILY
Qty: 30 TABLET | Refills: 0 | Status: SHIPPED | OUTPATIENT
Start: 2019-06-10 | End: 2019-07-08 | Stop reason: SDUPTHER

## 2019-06-12 ENCOUNTER — OFFICE VISIT (OUTPATIENT)
Dept: SPORTS MEDICINE | Facility: CLINIC | Age: 69
End: 2019-06-12

## 2019-06-12 VITALS
BODY MASS INDEX: 22.44 KG/M2 | SYSTOLIC BLOOD PRESSURE: 140 MMHG | OXYGEN SATURATION: 98 % | HEART RATE: 103 BPM | HEIGHT: 67 IN | WEIGHT: 143 LBS | DIASTOLIC BLOOD PRESSURE: 76 MMHG

## 2019-06-12 DIAGNOSIS — M25.551 RIGHT HIP PAIN: ICD-10-CM

## 2019-06-12 DIAGNOSIS — M54.41 CHRONIC RIGHT-SIDED LOW BACK PAIN WITH RIGHT-SIDED SCIATICA: ICD-10-CM

## 2019-06-12 DIAGNOSIS — G89.29 CHRONIC RIGHT-SIDED LOW BACK PAIN WITH RIGHT-SIDED SCIATICA: ICD-10-CM

## 2019-06-12 PROCEDURE — 72100 X-RAY EXAM L-S SPINE 2/3 VWS: CPT | Performed by: FAMILY MEDICINE

## 2019-06-12 PROCEDURE — 73502 X-RAY EXAM HIP UNI 2-3 VIEWS: CPT | Performed by: FAMILY MEDICINE

## 2019-06-12 PROCEDURE — 99214 OFFICE O/P EST MOD 30 MIN: CPT | Performed by: FAMILY MEDICINE

## 2019-06-12 NOTE — PROGRESS NOTES
"Laila is a 68 y.o. year old female evaluation of a problem that is new to this examiner.    Chief Complaint   Patient presents with   • Back Pain     x since teen years - nki    • Hip Pain     Right - x since early 80's - nki        History of Present Illness   HPI   1.  Patient is here today with complaint of right-sided and center low back pain since \"I was a teenager\".  No recent trauma.  In the past pain has been intermittent and improved with physical therapy.  She is now having more consistent pain and is particularly worse with walking which is her preferred method of exercise.  She occasionally has some radiation down to the right posterior hip.  No GI or  complaints.  No pain with supine position.  2.  Patient also have a right anterior hip discomfort, \"I recall this starting after doing quite a lot of gardening moving and carrying heavy stones doing a lot of twisting in the early 2000's\".  This discomfort is described as a soreness or ache and worse with prolonged sitting to standing and even sometimes with prolonged walking.  Describes stiffness.  No locking or giving way.  I have reviewed the patient's medical, family, and social history in detail and updated the computerized patient record.    Review of Systems   Constitutional: Negative for fever.   Musculoskeletal:        Per HPI   Skin: Negative for wound.   Neurological: Negative for numbness.   All other systems reviewed and are negative.      /76   Pulse 103   Ht 170.2 cm (67.01\")   Wt 64.9 kg (143 lb)   SpO2 98%   BMI 22.39 kg/m²      Physical Exam   Constitutional: She is oriented to person, place, and time. She appears well-developed and well-nourished.   HENT:   Head: Normocephalic and atraumatic.   Eyes: Conjunctivae and EOM are normal. Pupils are equal, round, and reactive to light.   Cardiovascular:   No peripheral edema   Pulmonary/Chest: Effort normal.   Musculoskeletal:   Lumbar spine normal in general appearance, patient " has some mild tenderness to palpation along the right paravertebral bundles in the lower lumbar spine and mild discomfort with palpation over the muscle attachment posterior superior iliac spine.  Minimal tenderness over the right SI joint.  Negative RAAD ER.  Negative straight leg raise.  DTRs 1+ symmetric.  Patient has full range of motion of the lumbar spine however she does have some mild increased discomfort on the right lower spine with back extension and ending.    Right hip normal in general appearance, negative logroll.  Patient has pain over the anterior hip with decreased range of motion on internal rotation.   Neurological: She is alert and oriented to person, place, and time.   Skin: Skin is warm and dry.   Psychiatric: She has a normal mood and affect. Her behavior is normal.   Vitals reviewed.  Lumbar Spine X-Ray  Indication: Pain  Views: AP and Lateral    Findings:  Degenerative disc disease most significant at L3-L4 and there is a mild anterolisthesis of L4 with respect to L5.  Also aortic calcifications.    No prior studies were available for comparison.  Right Hip X-Ray  Indication: Pain  AP and Frog Leg views    Findings:  Moderate FA arthritis    No prior studies were available for comparison.        Current Outpatient Medications:   •  acetaminophen (TYLENOL) 500 MG tablet, Take 1,000 mg by mouth Daily As Needed., Disp: , Rfl:   •  aspirin 325 MG tablet, Take 325 mg by mouth Daily., Disp: , Rfl:   •  atorvastatin (LIPITOR) 40 MG tablet, TAKE 1 TABLET BY MOUTH DAILY, Disp: 30 tablet, Rfl: 0  •  cetirizine (ZyrTEC) 10 MG tablet, Take 10 mg by mouth Daily As Needed., Disp: , Rfl:   •  DULoxetine (CYMBALTA) 30 MG capsule, take 1 BID, Disp: , Rfl: 1  •  DULoxetine (CYMBALTA) 60 MG capsule, TK 1 C PO QAM, Disp: , Rfl: 1  •  lisinopril (PRINIVIL,ZESTRIL) 30 MG tablet, TAKE 1 TABLET BY MOUTH EVERY DAY, Disp: 90 tablet, Rfl: 0  •  traMADol (ULTRAM) 50 MG tablet, TAKE 1 TABLET BY MOUTH THREE TIMES DAILY(  "EVERY 8 HOURS) AS NEEDED FOR PAIN, Disp: 90 tablet, Rfl: 0     Diagnoses and all orders for this visit:    Chronic right-sided low back pain with right-sided sciatica  -     XR Spine Lumbar 2 or 3 View  -     MRI Lumbar Spine Without Contrast; Future    Right hip pain  -     XR Hip With or Without Pelvis 2 - 3 View Right       1.  Right-sided low back pain with x-ray suggesting significant degenerative disc at L3-L4 and a mild anterolisthesis of L4 on L5, will check MRI.  Patient states this is her most limiting discomfort and preventing her from walking which is her preferred activity.  She will concentrate our efforts on helping her with the low back pain at this point  2.  Right hip arthritis-patient states this discomfort is not her most limiting discomfort \"I can live with this\", her back is her most limiting discomfort therefore will concentrate her efforts on helping the low back pain so that she can continue her walking.      EMR Dragon/Transcription disclaimer:    Much of this encounter note is an electronic transcription/translation of spoken language to printed text.  The electronic translation of spoken language may permit erroneous, or at times, nonsensical words or phrases to be inadvertently transcribed.  Although I have reviewed the note for such errors some may still exist.    "

## 2019-07-02 ENCOUNTER — HOSPITAL ENCOUNTER (OUTPATIENT)
Dept: MRI IMAGING | Facility: HOSPITAL | Age: 69
End: 2019-07-02

## 2019-07-03 ENCOUNTER — PROCEDURE VISIT (OUTPATIENT)
Dept: SPORTS MEDICINE | Facility: CLINIC | Age: 69
End: 2019-07-03

## 2019-07-03 VITALS
DIASTOLIC BLOOD PRESSURE: 80 MMHG | HEART RATE: 112 BPM | WEIGHT: 142 LBS | BODY MASS INDEX: 22.29 KG/M2 | SYSTOLIC BLOOD PRESSURE: 130 MMHG | HEIGHT: 67 IN | OXYGEN SATURATION: 98 %

## 2019-07-03 DIAGNOSIS — M16.11 ARTHRITIS OF RIGHT HIP: Primary | ICD-10-CM

## 2019-07-03 PROCEDURE — 99212 OFFICE O/P EST SF 10 MIN: CPT | Performed by: FAMILY MEDICINE

## 2019-07-03 NOTE — PROGRESS NOTES
Procedure   Procedures          Patient here for ultrasound-guided right hip injection for femoral acetabular arthritis.  Patient was placed in supine position and ultrasound was used to identify the injection site however after multiple attempts and probe changes was unable to accurately find appropriate place for injection with any confidence due to underlying arthritis and soft tissue changes.  Patient did not therefore received ultrasound-guided injection today but will instead be set up for right hip injection Kenalog 80 mg under fluoroscopy.

## 2019-07-08 RX ORDER — ATORVASTATIN CALCIUM 40 MG/1
40 TABLET, FILM COATED ORAL DAILY
Qty: 30 TABLET | Refills: 0 | Status: SHIPPED | OUTPATIENT
Start: 2019-07-08 | End: 2019-08-06 | Stop reason: SDUPTHER

## 2019-07-09 ENCOUNTER — HOSPITAL ENCOUNTER (OUTPATIENT)
Dept: GENERAL RADIOLOGY | Facility: HOSPITAL | Age: 69
Discharge: HOME | End: 2019-07-09
Admitting: FAMILY MEDICINE

## 2019-07-09 DIAGNOSIS — M16.11 ARTHRITIS OF RIGHT HIP: ICD-10-CM

## 2019-07-09 PROCEDURE — 25010000003 LIDOCAINE 1 % SOLUTION: Performed by: RADIOLOGY

## 2019-07-09 PROCEDURE — 77002 NEEDLE LOCALIZATION BY XRAY: CPT

## 2019-07-09 PROCEDURE — 25010000002 IOPAMIDOL 61 % SOLUTION: Performed by: RADIOLOGY

## 2019-07-09 PROCEDURE — 25010000002 METHYLPREDNISOLONE PER 125 MG: Performed by: RADIOLOGY

## 2019-07-09 RX ORDER — BUPIVACAINE HYDROCHLORIDE 2.5 MG/ML
10 INJECTION, SOLUTION EPIDURAL; INFILTRATION; INTRACAUDAL ONCE
Status: COMPLETED | OUTPATIENT
Start: 2019-07-09 | End: 2019-07-09

## 2019-07-09 RX ORDER — LIDOCAINE HYDROCHLORIDE 10 MG/ML
20 INJECTION, SOLUTION INFILTRATION; PERINEURAL ONCE
Status: COMPLETED | OUTPATIENT
Start: 2019-07-09 | End: 2019-07-09

## 2019-07-09 RX ORDER — METHYLPREDNISOLONE SODIUM SUCCINATE 125 MG/2ML
80 INJECTION, POWDER, LYOPHILIZED, FOR SOLUTION INTRAMUSCULAR; INTRAVENOUS
Status: COMPLETED | OUTPATIENT
Start: 2019-07-09 | End: 2019-07-09

## 2019-07-09 RX ADMIN — METHYLPREDNISOLONE SODIUM SUCCINATE 80 MG: 125 INJECTION, POWDER, LYOPHILIZED, FOR SOLUTION INTRAMUSCULAR; INTRAVENOUS at 14:30

## 2019-07-09 RX ADMIN — BUPIVACAINE HYDROCHLORIDE 5 ML: 2.5 INJECTION, SOLUTION EPIDURAL; INFILTRATION; INTRACAUDAL; PERINEURAL at 14:30

## 2019-07-09 RX ADMIN — IOPAMIDOL 1 ML: 612 INJECTION, SOLUTION INTRAVENOUS at 14:30

## 2019-07-09 RX ADMIN — LIDOCAINE HYDROCHLORIDE 2 ML: 10 INJECTION, SOLUTION INFILTRATION; PERINEURAL at 14:30

## 2019-07-11 ENCOUNTER — TELEPHONE (OUTPATIENT)
Dept: SPORTS MEDICINE | Facility: CLINIC | Age: 69
End: 2019-07-11

## 2019-07-11 ENCOUNTER — HOSPITAL ENCOUNTER (OUTPATIENT)
Dept: MRI IMAGING | Facility: HOSPITAL | Age: 69
Discharge: HOME | End: 2019-07-11
Admitting: FAMILY MEDICINE

## 2019-07-11 DIAGNOSIS — M54.41 CHRONIC RIGHT-SIDED LOW BACK PAIN WITH RIGHT-SIDED SCIATICA: ICD-10-CM

## 2019-07-11 DIAGNOSIS — G89.29 CHRONIC RIGHT-SIDED LOW BACK PAIN WITH RIGHT-SIDED SCIATICA: ICD-10-CM

## 2019-07-11 PROCEDURE — 72158 MRI LUMBAR SPINE W/O & W/DYE: CPT

## 2019-07-11 PROCEDURE — 82565 ASSAY OF CREATININE: CPT

## 2019-07-11 PROCEDURE — 0 GADOBENATE DIMEGLUMINE 529 MG/ML SOLUTION: Performed by: FAMILY MEDICINE

## 2019-07-11 PROCEDURE — A9577 INJ MULTIHANCE: HCPCS | Performed by: FAMILY MEDICINE

## 2019-07-11 RX ADMIN — GADOBENATE DIMEGLUMINE 14 ML: 529 INJECTION, SOLUTION INTRAVENOUS at 15:41

## 2019-07-11 NOTE — TELEPHONE ENCOUNTER
[facility] called and wanted to know if they could change the order for the patient to MRI with contrast, spoke with  and gave verbal orders to proceed with MRI with contrast.

## 2019-07-12 LAB — CREAT BLDA-MCNC: 0.9 MG/DL (ref 0.6–1.3)

## 2019-07-12 RX ORDER — LISINOPRIL 30 MG/1
TABLET ORAL
Qty: 90 TABLET | Refills: 0 | Status: SHIPPED | OUTPATIENT
Start: 2019-07-12 | End: 2019-10-01 | Stop reason: SDUPTHER

## 2019-07-18 ENCOUNTER — TELEPHONE (OUTPATIENT)
Dept: SPORTS MEDICINE | Facility: CLINIC | Age: 69
End: 2019-07-18

## 2019-07-18 DIAGNOSIS — M43.16 SPONDYLOLISTHESIS AT L4-L5 LEVEL: Primary | ICD-10-CM

## 2019-07-18 DIAGNOSIS — M51.36 DDD (DEGENERATIVE DISC DISEASE), LUMBAR: ICD-10-CM

## 2019-07-25 DIAGNOSIS — G89.29 CHRONIC BILATERAL LOW BACK PAIN WITHOUT SCIATICA: ICD-10-CM

## 2019-07-25 DIAGNOSIS — M54.50 CHRONIC BILATERAL LOW BACK PAIN WITHOUT SCIATICA: ICD-10-CM

## 2019-07-25 RX ORDER — TRAMADOL HYDROCHLORIDE 50 MG/1
TABLET ORAL
Qty: 90 TABLET | Refills: 0 | Status: SHIPPED | OUTPATIENT
Start: 2019-07-25 | End: 2019-09-11 | Stop reason: SDUPTHER

## 2019-08-06 RX ORDER — ATORVASTATIN CALCIUM 40 MG/1
40 TABLET, FILM COATED ORAL DAILY
Qty: 30 TABLET | Refills: 2 | Status: SHIPPED | OUTPATIENT
Start: 2019-08-06 | End: 2019-12-27

## 2019-09-11 DIAGNOSIS — G89.29 CHRONIC BILATERAL LOW BACK PAIN WITHOUT SCIATICA: ICD-10-CM

## 2019-09-11 DIAGNOSIS — M54.50 CHRONIC BILATERAL LOW BACK PAIN WITHOUT SCIATICA: ICD-10-CM

## 2019-09-12 RX ORDER — TRAMADOL HYDROCHLORIDE 50 MG/1
TABLET ORAL
Qty: 90 TABLET | Refills: 0 | Status: SHIPPED | OUTPATIENT
Start: 2019-09-12 | End: 2019-10-01

## 2019-10-01 ENCOUNTER — OFFICE VISIT (OUTPATIENT)
Dept: INTERNAL MEDICINE | Facility: CLINIC | Age: 69
End: 2019-10-01

## 2019-10-01 ENCOUNTER — APPOINTMENT (OUTPATIENT)
Dept: LAB | Facility: HOSPITAL | Age: 69
End: 2019-10-01

## 2019-10-01 VITALS
HEART RATE: 81 BPM | BODY MASS INDEX: 23.19 KG/M2 | OXYGEN SATURATION: 98 % | SYSTOLIC BLOOD PRESSURE: 142 MMHG | TEMPERATURE: 98.7 F | WEIGHT: 148.1 LBS | DIASTOLIC BLOOD PRESSURE: 70 MMHG

## 2019-10-01 DIAGNOSIS — F17.200 TOBACCO USE DISORDER: ICD-10-CM

## 2019-10-01 DIAGNOSIS — G89.29 CHRONIC BILATERAL LOW BACK PAIN WITHOUT SCIATICA: ICD-10-CM

## 2019-10-01 DIAGNOSIS — I63.532 CEREBROVASCULAR ACCIDENT (CVA) DUE TO OCCLUSION OF LEFT POSTERIOR CEREBRAL ARTERY (HCC): ICD-10-CM

## 2019-10-01 DIAGNOSIS — I10 ESSENTIAL HYPERTENSION: Primary | ICD-10-CM

## 2019-10-01 DIAGNOSIS — Z23 NEED FOR INFLUENZA VACCINATION: ICD-10-CM

## 2019-10-01 DIAGNOSIS — M54.50 CHRONIC BILATERAL LOW BACK PAIN WITHOUT SCIATICA: ICD-10-CM

## 2019-10-01 DIAGNOSIS — E78.2 MIXED HYPERLIPIDEMIA: ICD-10-CM

## 2019-10-01 LAB
CHOLEST SERPL-MCNC: 147 MG/DL (ref 0–200)
HDLC SERPL-MCNC: 59 MG/DL (ref 40–60)
LDLC SERPL CALC-MCNC: 73 MG/DL (ref 0–100)
LDLC/HDLC SERPL: 1.24 {RATIO}
TRIGL SERPL-MCNC: 73 MG/DL (ref 0–150)
VLDLC SERPL-MCNC: 14.6 MG/DL (ref 5–40)

## 2019-10-01 PROCEDURE — 80061 LIPID PANEL: CPT | Performed by: FAMILY MEDICINE

## 2019-10-01 PROCEDURE — 90653 IIV ADJUVANT VACCINE IM: CPT | Performed by: FAMILY MEDICINE

## 2019-10-01 PROCEDURE — G0008 ADMIN INFLUENZA VIRUS VAC: HCPCS | Performed by: FAMILY MEDICINE

## 2019-10-01 PROCEDURE — 99214 OFFICE O/P EST MOD 30 MIN: CPT | Performed by: FAMILY MEDICINE

## 2019-10-01 PROCEDURE — 36415 COLL VENOUS BLD VENIPUNCTURE: CPT | Performed by: FAMILY MEDICINE

## 2019-10-01 RX ORDER — LISINOPRIL 30 MG/1
30 TABLET ORAL DAILY
Qty: 90 TABLET | Refills: 3 | Status: SHIPPED | OUTPATIENT
Start: 2019-10-01 | End: 2019-10-07 | Stop reason: SDUPTHER

## 2019-10-01 NOTE — PROGRESS NOTES
Laila Lea is a 69 y.o. female.      Assessment/Plan   Problem List Items Addressed This Visit        Cardiovascular and Mediastinum    Hyperlipidemia    Relevant Orders    Lipid Panel    Essential hypertension - Primary    Relevant Medications    lisinopril (PRINIVIL,ZESTRIL) 30 MG tablet    Cerebrovascular accident (CVA) due to occlusion of left posterior cerebral artery (CMS/HCC)    Overview     Jonathan 1/2018            Nervous and Auditory    Chronic bilateral low back pain without sciatica       Other    Tobacco use disorder    Overview     Since age 17  1ppd              Continue treatment of hyperlipidemia hypertension monitor blood pressure with goal less than 140/90 continue attempts at smoking cessation signs and symptoms of CVA discussed as well as continuing aspirin therapy    Return in about 6 months (around 4/1/2020), or if symptoms worsen or fail to improve, for Recheck, Next scheduled follow up.      Chief Complaint   Patient presents with   • follow up to hypertension   • follow up to hyperlipidemia   • follow up to back pain     Social History     Tobacco Use   • Smoking status: Current Some Day Smoker     Packs/day: 1.00     Years: 50.00     Pack years: 50.00   • Smokeless tobacco: Never Used   Substance Use Topics   • Alcohol use: No     Comment: stopped drinking   • Drug use: Not on file       History of Present Illness   Patient follow-up appointment for chronic medical problems of history of CVA hypertension hyperlipidemia low back pain tobacco use she is feeling much better with her back after having consultation with physical therapy MRI and injection performed she monitors her blood pressure occasionally eats less than 140/90 she has no chest pain or shortness of breath no symptomology related to CVA is in the past she take an aspirin daily no unwanted side effects of lipid management  The following portions of the patient's history were reviewed and updated as  appropriate:PMHroutine: Social history , Allergies, Current Medications, Active Problem List and Health Maintenance    Review of Systems   Constitutional: Negative for activity change, appetite change and unexpected weight change.   HENT: Negative for nosebleeds and trouble swallowing.    Eyes: Negative for pain and visual disturbance.   Respiratory: Negative for chest tightness, shortness of breath and wheezing.    Cardiovascular: Negative for chest pain and palpitations.   Gastrointestinal: Negative for abdominal pain and blood in stool.   Endocrine: Negative.    Genitourinary: Negative for difficulty urinating and hematuria.   Musculoskeletal: Negative for joint swelling.   Skin: Negative for color change and rash.   Allergic/Immunologic: Negative.    Neurological: Negative for syncope and speech difficulty.   Hematological: Negative for adenopathy.   Psychiatric/Behavioral: Negative for agitation and confusion.   All other systems reviewed and are negative.      Objective   Vitals:    10/01/19 1406   BP: 142/70   BP Location: Right arm   Patient Position: Sitting   Cuff Size: Adult   Pulse: 81   Temp: 98.7 °F (37.1 °C)   TempSrc: Oral   SpO2: 98%   Weight: 67.2 kg (148 lb 1.6 oz)     Body mass index is 23.19 kg/m².  Physical Exam   Constitutional: She is oriented to person, place, and time. She appears well-developed and well-nourished. No distress.   HENT:   Head: Normocephalic and atraumatic.   Mouth/Throat: Oropharynx is clear and moist.   Eyes: Conjunctivae and EOM are normal. Pupils are equal, round, and reactive to light. Right eye exhibits no discharge. Left eye exhibits no discharge. No scleral icterus.   Neck: Normal range of motion. Neck supple. No tracheal deviation present. No thyromegaly present.   Cardiovascular: Normal rate, regular rhythm, normal heart sounds, intact distal pulses and normal pulses. Exam reveals no gallop.   No murmur heard.  Pulmonary/Chest: Effort normal and breath sounds  normal. No respiratory distress. She has no wheezes. She has no rales.   Musculoskeletal: Normal range of motion. She exhibits no edema or tenderness.   Neurological: She is alert and oriented to person, place, and time. She exhibits normal muscle tone. Coordination normal.   Skin: Skin is warm. No rash noted. No erythema. No pallor.   Psychiatric: She has a normal mood and affect. Her behavior is normal. Judgment and thought content normal.   Nursing note and vitals reviewed.    Reviewed Data:  No visits with results within 1 Month(s) from this visit.   Latest known visit with results is:   Hospital Outpatient Visit on 07/11/2019   Component Date Value Ref Range Status   • Creatinine 07/11/2019 0.90  0.60 - 1.30 mg/dL Final    Serial Number: [id number]:  [id number]

## 2019-10-07 RX ORDER — LISINOPRIL 30 MG/1
TABLET ORAL
Qty: 90 TABLET | Refills: 0 | Status: SHIPPED | OUTPATIENT
Start: 2019-10-07 | End: 2019-11-13 | Stop reason: SDUPTHER

## 2019-10-29 ENCOUNTER — TELEPHONE (OUTPATIENT)
Dept: INTERNAL MEDICINE | Facility: CLINIC | Age: 69
End: 2019-10-29

## 2019-10-29 RX ORDER — TRAMADOL HYDROCHLORIDE 50 MG/1
50 TABLET ORAL EVERY 8 HOURS PRN
Qty: 30 TABLET | Refills: 0 | Status: SHIPPED | OUTPATIENT
Start: 2019-10-29 | End: 2019-11-18 | Stop reason: SDUPTHER

## 2019-11-13 RX ORDER — LISINOPRIL 30 MG/1
30 TABLET ORAL DAILY
Qty: 90 TABLET | Refills: 0 | Status: SHIPPED | OUTPATIENT
Start: 2019-11-13 | End: 2020-02-03 | Stop reason: DRUGHIGH

## 2019-11-18 RX ORDER — TRAMADOL HYDROCHLORIDE 50 MG/1
TABLET ORAL
Qty: 30 TABLET | Refills: 0 | Status: SHIPPED | OUTPATIENT
Start: 2019-11-18 | End: 2021-09-09

## 2019-12-27 RX ORDER — ATORVASTATIN CALCIUM 40 MG/1
TABLET, FILM COATED ORAL
Qty: 30 TABLET | Refills: 0 | Status: SHIPPED | OUTPATIENT
Start: 2019-12-27 | End: 2020-01-30 | Stop reason: SDUPTHER

## 2020-01-30 RX ORDER — ATORVASTATIN CALCIUM 40 MG/1
40 TABLET, FILM COATED ORAL DAILY
Qty: 30 TABLET | Refills: 2 | Status: SHIPPED | OUTPATIENT
Start: 2020-01-30 | End: 2020-04-24

## 2020-02-03 ENCOUNTER — OFFICE VISIT (OUTPATIENT)
Dept: INTERNAL MEDICINE | Facility: CLINIC | Age: 70
End: 2020-02-03

## 2020-02-03 VITALS
HEIGHT: 67 IN | OXYGEN SATURATION: 98 % | WEIGHT: 147.9 LBS | SYSTOLIC BLOOD PRESSURE: 148 MMHG | TEMPERATURE: 98.3 F | DIASTOLIC BLOOD PRESSURE: 80 MMHG | BODY MASS INDEX: 23.21 KG/M2 | HEART RATE: 99 BPM

## 2020-02-03 DIAGNOSIS — R00.0 TACHYCARDIA: ICD-10-CM

## 2020-02-03 DIAGNOSIS — E78.2 MIXED HYPERLIPIDEMIA: ICD-10-CM

## 2020-02-03 DIAGNOSIS — I10 ESSENTIAL HYPERTENSION: Primary | ICD-10-CM

## 2020-02-03 PROCEDURE — 99214 OFFICE O/P EST MOD 30 MIN: CPT | Performed by: FAMILY MEDICINE

## 2020-02-03 RX ORDER — DULOXETIN HYDROCHLORIDE 30 MG/1
30 CAPSULE, DELAYED RELEASE ORAL DAILY
COMMUNITY
Start: 2020-01-21 | End: 2020-09-03

## 2020-02-03 RX ORDER — LISINOPRIL 40 MG/1
40 TABLET ORAL DAILY
COMMUNITY
Start: 2020-01-28 | End: 2020-02-28 | Stop reason: SDUPTHER

## 2020-02-03 RX ORDER — METOPROLOL SUCCINATE 25 MG/1
25 TABLET, EXTENDED RELEASE ORAL DAILY
Qty: 30 TABLET | Refills: 6 | Status: SHIPPED | OUTPATIENT
Start: 2020-02-03 | End: 2020-07-23

## 2020-02-03 RX ORDER — TIZANIDINE 4 MG/1
TABLET ORAL
COMMUNITY
Start: 2020-01-19 | End: 2021-09-09

## 2020-02-03 NOTE — PROGRESS NOTES
Laila Lea is a 69 y.o. female.      Assessment/Plan   Problem List Items Addressed This Visit        Cardiovascular and Mediastinum    Hyperlipidemia    Essential hypertension - Primary    Relevant Medications    lisinopril (PRINIVIL,ZESTRIL) 40 MG tablet    metoprolol succinate XL (TOPROL XL) 25 MG 24 hr tablet    Tachycardia         Add metoprolol for blood pressure and heart rate control monitor blood pressure with goal less than 140/90  Continue treatment with statin therapy for lipids will call in 1 week benefit adding metoprolol  Return in about 1 month (around 3/3/2020), or if symptoms worsen or fail to improve, for Recheck, Next scheduled follow up.      Chief Complaint   Patient presents with   • [facility] follow up to hypertension     Social History     Tobacco Use   • Smoking status: Current Every Day Smoker     Packs/day: 1.00     Years: 50.00     Pack years: 50.00   • Smokeless tobacco: Never Used   Substance Use Topics   • Alcohol use: No     Comment: stopped drinking   • Drug use: Not on file       History of Present Illness   Patient follows up for chronic problems of hypertension hyperlipidemia tachycardia smoker she is finding very difficult to quit smoking and has had recent CVA and understands the importance of smoking cessation to alleviate further neurologic events monitor his blood pressures well she has no chest pain or shortness of breath she does not [employer] feel her heart racing very much blood pressures usually less than 140/90  The following portions of the patient's history were reviewed and updated as appropriate:PMHroutine: Social history , Allergies, Current Medications, Active Problem List and Health Maintenance    Review of Systems   Constitutional: Negative for activity change, appetite change and unexpected weight change.   HENT: Negative for nosebleeds and trouble swallowing.    Eyes: Negative for pain and visual disturbance.   Respiratory: Negative for  "chest tightness, shortness of breath and wheezing.    Cardiovascular: Negative for chest pain and palpitations.   Gastrointestinal: Negative for abdominal pain and blood in stool.   Endocrine: Negative.    Genitourinary: Negative for difficulty urinating and hematuria.   Musculoskeletal: Negative for joint swelling.   Skin: Negative for color change and rash.   Allergic/Immunologic: Negative.    Neurological: Negative for syncope and speech difficulty.   Hematological: Negative for adenopathy.   Psychiatric/Behavioral: Negative for agitation and confusion.   All other systems reviewed and are negative.      Objective   Vitals:    02/03/20 1326   BP: 148/80   BP Location: Right arm   Patient Position: Sitting   Cuff Size: Adult   Pulse: 99   Temp: 98.3 °F (36.8 °C)   TempSrc: Oral   SpO2: 98%   Weight: 67.1 kg (147 lb 14.4 oz)   Height: 170.2 cm (67.01\")     Body mass index is 23.16 kg/m².  Physical Exam   Constitutional: She is oriented to person, place, and time. She appears well-developed and well-nourished. No distress.   HENT:   Head: Normocephalic and atraumatic.   Right Ear: External ear normal.   Left Ear: External ear normal.   Mouth/Throat: Oropharynx is clear and moist.   Eyes: Pupils are equal, round, and reactive to light. Conjunctivae and EOM are normal. Right eye exhibits no discharge. Left eye exhibits no discharge. No scleral icterus.   Neck: Normal range of motion. Neck supple. No tracheal deviation present. No thyromegaly present.   Cardiovascular: Normal rate, regular rhythm, normal heart sounds and normal pulses.   Pulmonary/Chest: Effort normal and breath sounds normal. No respiratory distress. She has no wheezes. She has no rales.   Musculoskeletal: Normal range of motion.   Neurological: She is alert and oriented to person, place, and time. She exhibits normal muscle tone. Coordination normal.   Skin: Skin is warm. No rash noted. No erythema. No pallor.   Psychiatric: She has a normal mood and " affect. Her behavior is normal. Judgment and thought content normal.   Nursing note and vitals reviewed.    Reviewed Data:  No visits with results within 1 Month(s) from this visit.   Latest known visit with results is:   Office Visit on 10/01/2019   Component Date Value Ref Range Status   • Total Cholesterol 10/01/2019 147  0 - 200 mg/dL Final   • Triglycerides 10/01/2019 73  0 - 150 mg/dL Final   • HDL Cholesterol 10/01/2019 59  40 - 60 mg/dL Final   • LDL Cholesterol  10/01/2019 73  0 - 100 mg/dL Final   • VLDL Cholesterol 10/01/2019 14.6  5 - 40 mg/dL Final   • LDL/HDL Ratio 10/01/2019 1.24   Final

## 2020-02-27 RX ORDER — LISINOPRIL 40 MG/1
40 TABLET ORAL DAILY
Status: CANCELLED | OUTPATIENT
Start: 2020-02-27

## 2020-02-28 ENCOUNTER — OFFICE VISIT (OUTPATIENT)
Dept: INTERNAL MEDICINE | Facility: CLINIC | Age: 70
End: 2020-02-28

## 2020-02-28 VITALS
WEIGHT: 148.5 LBS | DIASTOLIC BLOOD PRESSURE: 88 MMHG | HEIGHT: 67 IN | OXYGEN SATURATION: 98 % | HEART RATE: 78 BPM | SYSTOLIC BLOOD PRESSURE: 150 MMHG | BODY MASS INDEX: 23.31 KG/M2 | TEMPERATURE: 98.3 F

## 2020-02-28 DIAGNOSIS — I73.9 CLAUDICATION (HCC): ICD-10-CM

## 2020-02-28 DIAGNOSIS — R09.89 ABDOMINAL BRUIT: ICD-10-CM

## 2020-02-28 DIAGNOSIS — I65.23 BILATERAL CAROTID ARTERY STENOSIS: ICD-10-CM

## 2020-02-28 DIAGNOSIS — I63.532 CEREBROVASCULAR ACCIDENT (CVA) DUE TO OCCLUSION OF LEFT POSTERIOR CEREBRAL ARTERY (HCC): Primary | ICD-10-CM

## 2020-02-28 PROCEDURE — 99213 OFFICE O/P EST LOW 20 MIN: CPT | Performed by: FAMILY MEDICINE

## 2020-02-28 RX ORDER — LISINOPRIL 40 MG/1
40 TABLET ORAL DAILY
Qty: 90 TABLET | Refills: 3 | Status: SHIPPED | OUTPATIENT
Start: 2020-02-28 | End: 2021-04-27

## 2020-02-28 NOTE — PROGRESS NOTES
Laila Lea is a 69 y.o. female.      Assessment/Plan   Problem List Items Addressed This Visit        Cardiovascular and Mediastinum    Cerebrovascular accident (CVA) due to occlusion of left posterior cerebral artery (CMS/HCC) - Primary    Overview     [facility] 1/2018         Relevant Orders    Ambulatory Referral to Vascular Surgery    Bilateral carotid artery stenosis    Relevant Orders    Ambulatory Referral to Vascular Surgery       Nervous and Auditory    Claudication (CMS/HCC)    Relevant Orders    Ambulatory Referral to Vascular Surgery       Other    Abdominal bruit    Relevant Orders    Ambulatory Referral to Vascular Surgery             Return in about 3 months (around 5/28/2020), or if symptoms worsen or fail to improve, for Recheck, Next scheduled follow up.      Chief Complaint   Patient presents with   • follow up to home risk screening     [facility]     Social History     Tobacco Use   • Smoking status: Current Every Day Smoker     Packs/day: 1.00     Years: 50.00     Pack years: 50.00   • Smokeless tobacco: Never Used   Substance Use Topics   • Alcohol use: No     Comment: stopped drinking   • Drug use: Not on file       History of Present Illness   Patient follows up because she has had screening PAD through home evaluation revealing what is been known to be systemic vascular disease she has previous testing which support this she has purpling of her feet she does not complain of any pain but she does not walk because she says she has some hip pain but does respond intermittently to anti-inflammatories she still smokes blood pressures been fairly well controlled with antihypertensive medication she does monitor she has no focal signs or symptoms attributable to recurrence of CVA or TIA  The following portions of the patient's history were reviewed and updated as appropriate:PMHroutine: Social history , Allergies, Current Medications, Active Problem List and Health Maintenance    Review  "of Systems   Constitutional: Negative.    HENT: Negative.    Eyes: Negative.    Respiratory: Positive for shortness of breath.    Cardiovascular: Negative for palpitations and leg swelling.   Gastrointestinal: Negative.    Genitourinary: Negative.    Musculoskeletal: Positive for back pain and gait problem.   Skin: Negative.    Neurological: Positive for light-headedness. Negative for dizziness, tremors, seizures and weakness.   Hematological: Negative.    Psychiatric/Behavioral: Positive for dysphoric mood. Negative for sleep disturbance.       Objective   Vitals:    02/28/20 1054   BP: 150/88   BP Location: Right arm   Patient Position: Sitting   Cuff Size: Adult   Pulse: 78   Temp: 98.3 °F (36.8 °C)   TempSrc: Oral   SpO2: 98%   Weight: 67.4 kg (148 lb 8 oz)   Height: 170.2 cm (67.01\")     Body mass index is 23.25 kg/m².  Physical Exam   Constitutional: She appears well-developed and well-nourished.   Cardiovascular: Normal rate, regular rhythm and normal heart sounds. Exam reveals decreased pulses.   Pulses:       Carotid pulses are on the right side with bruit, and on the left side with bruit.       Femoral pulses are on the right side with bruit, and on the left side with bruit.       Dorsalis pedis pulses are 0 on the right side, and 0 on the left side.        Posterior tibial pulses are 0 on the right side, and 0 on the left side.   Nursing note and vitals reviewed.    Reviewed Data:  No visits with results within 1 Month(s) from this visit.   Latest known visit with results is:   Office Visit on 10/01/2019   Component Date Value Ref Range Status   • Total Cholesterol 10/01/2019 147  0 - 200 mg/dL Final   • Triglycerides 10/01/2019 73  0 - 150 mg/dL Final   • HDL Cholesterol 10/01/2019 59  40 - 60 mg/dL Final   • LDL Cholesterol  10/01/2019 73  0 - 100 mg/dL Final   • VLDL Cholesterol 10/01/2019 14.6  5 - 40 mg/dL Final   • LDL/HDL Ratio 10/01/2019 1.24   Final     "

## 2020-04-24 RX ORDER — ATORVASTATIN CALCIUM 40 MG/1
40 TABLET, FILM COATED ORAL DAILY
Qty: 30 TABLET | Refills: 2 | Status: SHIPPED | OUTPATIENT
Start: 2020-04-24 | End: 2020-07-23

## 2020-07-23 RX ORDER — METOPROLOL SUCCINATE 25 MG/1
25 TABLET, EXTENDED RELEASE ORAL DAILY
Qty: 30 TABLET | Refills: 6 | Status: SHIPPED | OUTPATIENT
Start: 2020-07-23 | End: 2021-01-19

## 2020-07-23 RX ORDER — ATORVASTATIN CALCIUM 40 MG/1
40 TABLET, FILM COATED ORAL DAILY
Qty: 30 TABLET | Refills: 2 | Status: SHIPPED | OUTPATIENT
Start: 2020-07-23 | End: 2020-10-21

## 2020-07-31 ENCOUNTER — TELEPHONE (OUTPATIENT)
Dept: INTERNAL MEDICINE | Facility: CLINIC | Age: 70
End: 2020-07-31

## 2020-09-03 ENCOUNTER — LAB (OUTPATIENT)
Dept: LAB | Facility: HOSPITAL | Age: 70
End: 2020-09-03

## 2020-09-03 ENCOUNTER — OFFICE VISIT (OUTPATIENT)
Dept: INTERNAL MEDICINE | Facility: CLINIC | Age: 70
End: 2020-09-03

## 2020-09-03 VITALS
DIASTOLIC BLOOD PRESSURE: 80 MMHG | OXYGEN SATURATION: 97 % | BODY MASS INDEX: 21.97 KG/M2 | SYSTOLIC BLOOD PRESSURE: 146 MMHG | WEIGHT: 140 LBS | HEART RATE: 89 BPM | HEIGHT: 67 IN

## 2020-09-03 DIAGNOSIS — I73.9 CLAUDICATION (HCC): ICD-10-CM

## 2020-09-03 DIAGNOSIS — E79.0 HYPERURICEMIA: ICD-10-CM

## 2020-09-03 DIAGNOSIS — E78.2 MIXED HYPERLIPIDEMIA: Primary | ICD-10-CM

## 2020-09-03 DIAGNOSIS — F17.219 CIGARETTE NICOTINE DEPENDENCE WITH NICOTINE-INDUCED DISORDER: ICD-10-CM

## 2020-09-03 DIAGNOSIS — Z00.00 MEDICARE ANNUAL WELLNESS VISIT, SUBSEQUENT: ICD-10-CM

## 2020-09-03 DIAGNOSIS — F17.200 TOBACCO USE DISORDER: ICD-10-CM

## 2020-09-03 DIAGNOSIS — Z23 NEED FOR INFLUENZA VACCINATION: ICD-10-CM

## 2020-09-03 DIAGNOSIS — D72.829 LEUKOCYTOSIS, UNSPECIFIED TYPE: ICD-10-CM

## 2020-09-03 DIAGNOSIS — I10 ESSENTIAL HYPERTENSION: ICD-10-CM

## 2020-09-03 DIAGNOSIS — Z23 ENCOUNTER FOR IMMUNIZATION: ICD-10-CM

## 2020-09-03 LAB
ALBUMIN SERPL-MCNC: 3.9 G/DL (ref 3.5–5.2)
ALBUMIN/GLOB SERPL: 1.3 G/DL
ALP SERPL-CCNC: 64 U/L (ref 39–117)
ALT SERPL W P-5'-P-CCNC: 11 U/L (ref 1–33)
ANION GAP SERPL CALCULATED.3IONS-SCNC: 7.6 MMOL/L (ref 5–15)
AST SERPL-CCNC: 13 U/L (ref 1–32)
BASOPHILS # BLD AUTO: 0.05 10*3/MM3 (ref 0–0.2)
BASOPHILS NFR BLD AUTO: 0.5 % (ref 0–1.5)
BILIRUB SERPL-MCNC: 0.3 MG/DL (ref 0–1.2)
BUN SERPL-MCNC: 14 MG/DL (ref 8–23)
BUN/CREAT SERPL: 14.3 (ref 7–25)
CALCIUM SPEC-SCNC: 9.8 MG/DL (ref 8.6–10.5)
CHLORIDE SERPL-SCNC: 106 MMOL/L (ref 98–107)
CO2 SERPL-SCNC: 29.4 MMOL/L (ref 22–29)
CREAT SERPL-MCNC: 0.98 MG/DL (ref 0.57–1)
DEPRECATED RDW RBC AUTO: 40.3 FL (ref 37–54)
EOSINOPHIL # BLD AUTO: 0.13 10*3/MM3 (ref 0–0.4)
EOSINOPHIL NFR BLD AUTO: 1.2 % (ref 0.3–6.2)
ERYTHROCYTE [DISTWIDTH] IN BLOOD BY AUTOMATED COUNT: 12.8 % (ref 12.3–15.4)
GFR SERPL CREATININE-BSD FRML MDRD: 56 ML/MIN/1.73
GLOBULIN UR ELPH-MCNC: 2.9 GM/DL
GLUCOSE SERPL-MCNC: 92 MG/DL (ref 65–99)
HCT VFR BLD AUTO: 40.8 % (ref 34–46.6)
HGB BLD-MCNC: 14.1 G/DL (ref 12–15.9)
IMM GRANULOCYTES # BLD AUTO: 0.04 10*3/MM3 (ref 0–0.05)
IMM GRANULOCYTES NFR BLD AUTO: 0.4 % (ref 0–0.5)
LYMPHOCYTES # BLD AUTO: 2.47 10*3/MM3 (ref 0.7–3.1)
LYMPHOCYTES NFR BLD AUTO: 23.7 % (ref 19.6–45.3)
MCH RBC QN AUTO: 30.4 PG (ref 26.6–33)
MCHC RBC AUTO-ENTMCNC: 34.6 G/DL (ref 31.5–35.7)
MCV RBC AUTO: 87.9 FL (ref 79–97)
MONOCYTES # BLD AUTO: 0.75 10*3/MM3 (ref 0.1–0.9)
MONOCYTES NFR BLD AUTO: 7.2 % (ref 5–12)
NEUTROPHILS NFR BLD AUTO: 6.97 10*3/MM3 (ref 1.7–7)
NEUTROPHILS NFR BLD AUTO: 67 % (ref 42.7–76)
NRBC BLD AUTO-RTO: 0 /100 WBC (ref 0–0.2)
PLATELET # BLD AUTO: 262 10*3/MM3 (ref 140–450)
PMV BLD AUTO: 11.4 FL (ref 6–12)
POTASSIUM SERPL-SCNC: 4 MMOL/L (ref 3.5–5.2)
PROT SERPL-MCNC: 6.8 G/DL (ref 6–8.5)
RBC # BLD AUTO: 4.64 10*6/MM3 (ref 3.77–5.28)
SODIUM SERPL-SCNC: 143 MMOL/L (ref 136–145)
WBC # BLD AUTO: 10.41 10*3/MM3 (ref 3.4–10.8)

## 2020-09-03 PROCEDURE — 36415 COLL VENOUS BLD VENIPUNCTURE: CPT | Performed by: FAMILY MEDICINE

## 2020-09-03 PROCEDURE — 90694 VACC AIIV4 NO PRSRV 0.5ML IM: CPT | Performed by: FAMILY MEDICINE

## 2020-09-03 PROCEDURE — 80053 COMPREHEN METABOLIC PANEL: CPT | Performed by: FAMILY MEDICINE

## 2020-09-03 PROCEDURE — 99214 OFFICE O/P EST MOD 30 MIN: CPT | Performed by: FAMILY MEDICINE

## 2020-09-03 PROCEDURE — G0439 PPPS, SUBSEQ VISIT: HCPCS | Performed by: FAMILY MEDICINE

## 2020-09-03 PROCEDURE — G0008 ADMIN INFLUENZA VIRUS VAC: HCPCS | Performed by: FAMILY MEDICINE

## 2020-09-03 PROCEDURE — 96160 PT-FOCUSED HLTH RISK ASSMT: CPT | Performed by: FAMILY MEDICINE

## 2020-09-03 PROCEDURE — 85025 COMPLETE CBC W/AUTO DIFF WBC: CPT | Performed by: FAMILY MEDICINE

## 2020-09-03 RX ORDER — AMLODIPINE BESYLATE 5 MG/1
5 TABLET ORAL DAILY
Qty: 30 TABLET | Refills: 3 | Status: SHIPPED | OUTPATIENT
Start: 2020-09-03 | End: 2021-01-22

## 2020-09-03 NOTE — PROGRESS NOTES
PT ARRIVED BY STRETCHER. PLACED ON MONITOR. ASSESSMENT COMPLETED.
VSS. The ABCs of the Annual Wellness Visit  Subsequent Medicare Wellness Visit    Chief Complaint   Patient presents with   • follow up to hypertension   • follow up to depression   • Medicare Wellness-subsequent   • need referral to Dr. Orourke   • toes on left foot turing black       Subjective   History of Present Illness:  Laila Lea is a 70 y.o. female who presents for a Subsequent Medicare Wellness Visit.    HEALTH RISK ASSESSMENT    Recent Hospitalizations:  No hospitalization(s) within the last year.    Current Medical Providers:  Patient Care Team:  James Bourgeois MD as PCP - General  James Bourgeois MD as PCP - Family Medicine    Smoking Status:  Social History     Tobacco Use   Smoking Status Current Every Day Smoker   • Packs/day: 1.00   • Years: 50.00   • Pack years: 50.00   Smokeless Tobacco Never Used       Alcohol Consumption:  Social History     Substance and Sexual Activity   Alcohol Use No    Comment: stopped drinking       Depression Screen:   PHQ-2/PHQ-9 Depression Screening 9/3/2020   Little interest or pleasure in doing things 3   Feeling down, depressed, or hopeless 3   Trouble falling or staying asleep, or sleeping too much 3   Feeling tired or having little energy 3   Poor appetite or overeating 3   Feeling bad about yourself - or that you are a failure or have let yourself or your family down 3   Trouble concentrating on things, such as reading the newspaper or watching television 3   Moving or speaking so slowly that other people could have noticed. Or the opposite - being so fidgety or restless that you have been moving around a lot more than usual 3   Thoughts that you would be better off dead, or of hurting yourself in some way 0   Total Score 24   If you checked off any problems, how difficult have these problems made it for you to do your work, take care of things at home, or get along with other people? Extremely dIfficult       Fall Risk Screen:  ODETTE Fall Risk Assessment  was completed, and patient is at MODERATE risk for falls. Assessment completed on:9/3/2020    Health Habits and Functional and Cognitive Screening:  Functional & Cognitive Status 9/3/2020   Do you have difficulty preparing food and eating? No   Do you have difficulty bathing yourself, getting dressed or grooming yourself? No   Do you have difficulty using the toilet? No   Do you have difficulty moving around from place to place? No   Do you have trouble with steps or getting out of a bed or a chair? Yes   Current Diet Unhealthy Diet   Dental Exam Not up to date   Eye Exam Not up to date   Exercise (times per week) 0 times per week   Current Exercise Activities Include None   Do you need help using the phone?  No   Are you deaf or do you have serious difficulty hearing?  No   Do you need help with transportation? No   Do you need help shopping? No   Do you need help preparing meals?  No   Do you need help with housework?  Yes   Do you need help with laundry? No   Do you need help taking your medications? No   Do you need help managing money? No   Do you ever drive or ride in a car without wearing a seat belt? No   Have you felt unusual stress, anger or loneliness in the last month? Yes   Who do you live with? Alone   If you need help, do you have trouble finding someone available to you? Yes   Have you been bothered in the last four weeks by sexual problems? No   Do you have difficulty concentrating, remembering or making decisions? Yes         Does the patient have evidence of cognitive impairment? No    Asprin use counseling:Does not need ASA (and currently is not on it)    Age-appropriate Screening Schedule:  Refer to the list below for future screening recommendations based on patient's age, sex and/or medical conditions. Orders for these recommended tests are listed in the plan section. The patient has been provided with a written plan.    Health Maintenance   Topic Date Due   • ZOSTER VACCINE (1 of 2)  08/01/1999   • LIPID PANEL  10/01/2020   • DXA SCAN  05/28/2021   • COLONOSCOPY  01/01/2022   • INFLUENZA VACCINE  Completed   • TDAP/TD VACCINES  Discontinued          The following portions of the patient's history were reviewed and updated as appropriate: allergies, current medications, past family history, past medical history, past social history, past surgical history and problem list.    Outpatient Medications Prior to Visit   Medication Sig Dispense Refill   • acetaminophen (TYLENOL) 500 MG tablet Take 1,000 mg by mouth Daily As Needed.     • aspirin 325 MG tablet Take 325 mg by mouth Daily.     • atorvastatin (LIPITOR) 40 MG tablet TAKE 1 TABLET BY MOUTH DAILY 30 tablet 2   • AZO-CRANBERRY PO Take 1 tablet by mouth Daily.     • CBD (cannabidiol) oral oil Take 1 drop by mouth 2 (Two) Times a Day.     • cetirizine (ZyrTEC) 10 MG tablet Take 10 mg by mouth Daily As Needed.     • Cholecalciferol (VITAMIN D3 PO) Take 1 tablet by mouth Daily.     • DULoxetine (CYMBALTA) 60 MG capsule TK 1 C PO QAM  1   • lisinopril (PRINIVIL,ZESTRIL) 40 MG tablet Take 1 tablet by mouth Daily. 90 tablet 3   • metoprolol succinate XL (TOPROL-XL) 25 MG 24 hr tablet TAKE 1 TABLET BY MOUTH DAILY 30 tablet 6   • tiZANidine (ZANAFLEX) 4 MG tablet 1-2 tablets BID prn     • traMADol (ULTRAM) 50 MG tablet TAKE ONE TABLET BY MOUTH EVERY 8 HOURS AS NEEDED FOR MODERATE PAIN 30 tablet 0   • Brexpiprazole (REXULTI) 3 MG tablet Take 1 tablet by mouth Daily.     • DULoxetine (CYMBALTA) 30 MG capsule Take 30 mg by mouth Daily.       No facility-administered medications prior to visit.        Patient Active Problem List   Diagnosis   • Osteopenia   • Tension headache   • Vitamin D deficiency   • Benign neoplasm of adrenal cortex   • Adrenal gland neoplasm   • Claudication (CMS/HCC)   • Cervical radiculopathy   • Cyst of finger   • Depression   • Eczema   • Serum creatinine raised   • Fatigue   • Hyperlipidemia   • Essential hypertension   •  "Insomnia   • Lumbar radiculopathy   • Sciatica   • Chronic bilateral low back pain without sciatica   • Hyperuricemia   • Cerebrovascular accident (CVA) due to occlusion of left posterior cerebral artery (CMS/[facility])   • Nicotine dependence   • Tobacco use disorder   • Health care maintenance   • Medicare annual wellness visit, subsequent   • Menopause   • Abdominal bruit   • Bilateral carotid artery stenosis       Advanced Care Planning:  ACP discussion was held with the patient during this visit. Patient has an advance directive in EMR which is still valid.     Review of Systems    Compared to one year ago, the patient feels her physical health is worse.  Compared to one year ago, the patient feels her mental health is worse.    Reviewed chart for potential of high risk medication in the elderly: yes  Reviewed chart for potential of harmful drug interactions in the elderly:yes    Objective         Vitals:    09/03/20 1346   BP: 146/80   BP Location: Right arm   Patient Position: Sitting   Cuff Size: Adult   Pulse: 89   SpO2: 97%   Weight: 63.5 kg (140 lb)   Height: 170.2 cm (67.01\")   PainSc:   4       Body mass index is 21.92 kg/m².  Discussed the patient's BMI with her. The BMI is in the acceptable range.    Physical Exam          Assessment/Plan   Medicare Risks and Personalized Health Plan  CMS Preventative Services Quick Reference  Advance Directive Discussion  Cardiovascular risk  Depression/Dysphoria  Immunizations Discussed/Encouraged (specific immunizations; Influenza )    The above risks/problems have been discussed with the patient.  Pertinent information has been shared with the patient in the After Visit Summary.  Follow up plans and orders are seen below in the Assessment/Plan Section.    Diagnoses and all orders for this visit:    1. Mixed hyperlipidemia (Primary)    2. Essential hypertension  -     amLODIPine (NORVASC) 5 MG tablet; Take 1 tablet by mouth Daily.  Dispense: 30 tablet; Refill: 3    3. " Cigarette nicotine dependence with nicotine-induced disorder    4. Medicare annual wellness visit, subsequent  -     Fluad Quad >65 years    5. Claudication (CMS/[facility])  -     Ambulatory Referral to Vascular Surgery  -     US Ankle / Brachial Indices Extremity Complete; Future    6. Tobacco use disorder    7. Hyperuricemia  -     Comprehensive Metabolic Panel    8. Leukocytosis, unspecified type  -     CBC & Differential  -     CBC Auto Differential    9. Encounter for immunization   -     Fluad Quad >65 years    10. Need for influenza vaccination      Follow Up:  Return in about 1 month (around 10/3/2020) for Recheck, Next scheduled follow up.     An After Visit Summary and PPPS were given to the patient.         Recommend smoking cessation as this is contributing to her vascular problems more than any of her other medical issues follow-up 1 month

## 2020-09-03 NOTE — PROGRESS NOTES
Laila Lea is a 70 y.o. female.      Assessment/Plan   Problem List Items Addressed This Visit        Cardiovascular and Mediastinum    Hyperlipidemia - Primary    Essential hypertension    Relevant Medications    amLODIPine (NORVASC) 5 MG tablet       Nervous and Auditory    Claudication (CMS/HCC)    Relevant Orders    Ambulatory Referral to Vascular Surgery    US Ankle / Brachial Indices Extremity Complete       Other    Hyperuricemia    Relevant Orders    Comprehensive Metabolic Panel (Completed)    Nicotine dependence    Tobacco use disorder    Overview     Since age 17  1ppd         Medicare annual wellness visit, subsequent    Relevant Orders    Fluad Quad >65 years (Completed)      Other Visit Diagnoses     Leukocytosis, unspecified type        Relevant Orders    CBC & Differential (Completed)    CBC Auto Differential (Completed)    Encounter for immunization         Relevant Orders    Fluad Quad >65 years (Completed)    Need for influenza vaccination             Add amlodipine  Consult vascular surgery  Follow-up results of blood work  Courage smoking cessation    Return in about 1 month (around 10/3/2020) for Recheck, Next scheduled follow up.      Chief Complaint   Patient presents with   • follow up to hypertension   • follow up to depression   • Medicare Wellness-subsequent   • need referral to Dr. Orourke   • toes on left foot turing black     Social History     Tobacco Use   • Smoking status: Current Every Day Smoker     Packs/day: 1.00     Years: 50.00     Pack years: 50.00   • Smokeless tobacco: Never Used   Substance Use Topics   • Alcohol use: No     Comment: stopped drinking   • Drug use: Not on file       History of Present Illness   Patient follow-up appointment to discuss chronic medical problems of hypertension hyperlipidemia claudication cyanotic lower extremities screening quantiflo revealing severe blood flow restriction.  Her blood pressure is been gradually increasing she usually  "gets readings in the 1 30-1 50 range no chest pain chronic shortness of breath smoking more than a pack of cigarettes a day because of \"wooden sitting in her house her mood seems to be fairly stable patient taking the Cymbalta.  He is intolerant of statin therapy  The following portions of the patient's history were reviewed and updated as appropriate:PMHroutine: Social history , Allergies, Current Medications, Active Problem List and Health Maintenance    Review of Systems   Constitutional: Negative.    HENT: Negative.    Respiratory: Negative.    Gastrointestinal: Negative.    Neurological: Negative.        Objective   Vitals:    09/03/20 1346   BP: 146/80   BP Location: Right arm   Patient Position: Sitting   Cuff Size: Adult   Pulse: 89   SpO2: 97%   Weight: 63.5 kg (140 lb)   Height: 170.2 cm (67.01\")     Body mass index is 21.92 kg/m².  Physical Exam   Constitutional: She is oriented to person, place, and time. She appears well-developed and well-nourished.   HENT:   Head: Normocephalic and atraumatic.   Cardiovascular: Normal rate and regular rhythm.   Cyanotic feet bilaterally   Pulmonary/Chest: Effort normal and breath sounds normal.   Neurological: She is alert and oriented to person, place, and time.   Psychiatric: She has a normal mood and affect. Her behavior is normal. Judgment and thought content normal.   Nursing note and vitals reviewed.    Reviewed Data:  Office Visit on 09/03/2020   Component Date Value Ref Range Status   • Glucose 09/03/2020 92  65 - 99 mg/dL Final   • BUN 09/03/2020 14  8 - 23 mg/dL Final   • Creatinine 09/03/2020 0.98  0.57 - 1.00 mg/dL Final   • Sodium 09/03/2020 143  136 - 145 mmol/L Final   • Potassium 09/03/2020 4.0  3.5 - 5.2 mmol/L Final   • Chloride 09/03/2020 106  98 - 107 mmol/L Final   • CO2 09/03/2020 29.4* 22.0 - 29.0 mmol/L Final   • Calcium 09/03/2020 9.8  8.6 - 10.5 mg/dL Final   • Total Protein 09/03/2020 6.8  6.0 - 8.5 g/dL Final   • Albumin 09/03/2020 3.90  " 3.50 - 5.20 g/dL Final   • ALT (SGPT) 09/03/2020 11  1 - 33 U/L Final   • AST (SGOT) 09/03/2020 13  1 - 32 U/L Final   • Alkaline Phosphatase 09/03/2020 64  39 - 117 U/L Final   • Total Bilirubin 09/03/2020 0.3  0.0 - 1.2 mg/dL Final   • eGFR Non African Amer 09/03/2020 56* >60 mL/min/1.73 Final   • Globulin 09/03/2020 2.9  gm/dL Final   • A/G Ratio 09/03/2020 1.3  g/dL Final   • BUN/Creatinine Ratio 09/03/2020 14.3  7.0 - 25.0 Final   • Anion Gap 09/03/2020 7.6  5.0 - 15.0 mmol/L Final   • WBC 09/03/2020 10.41  3.40 - 10.80 10*3/mm3 Final   • RBC 09/03/2020 4.64  3.77 - 5.28 10*6/mm3 Final   • Hemoglobin 09/03/2020 14.1  12.0 - 15.9 g/dL Final   • Hematocrit 09/03/2020 40.8  34.0 - 46.6 % Final   • MCV 09/03/2020 87.9  79.0 - 97.0 fL Final   • MCH 09/03/2020 30.4  26.6 - 33.0 pg Final   • MCHC 09/03/2020 34.6  31.5 - 35.7 g/dL Final   • RDW 09/03/2020 12.8  12.3 - 15.4 % Final   • RDW-SD 09/03/2020 40.3  37.0 - 54.0 fl Final   • MPV 09/03/2020 11.4  6.0 - 12.0 fL Final   • Platelets 09/03/2020 262  140 - 450 10*3/mm3 Final   • Neutrophil % 09/03/2020 67.0  42.7 - 76.0 % Final   • Lymphocyte % 09/03/2020 23.7  19.6 - 45.3 % Final   • Monocyte % 09/03/2020 7.2  5.0 - 12.0 % Final   • Eosinophil % 09/03/2020 1.2  0.3 - 6.2 % Final   • Basophil % 09/03/2020 0.5  0.0 - 1.5 % Final   • Immature Grans % 09/03/2020 0.4  0.0 - 0.5 % Final   • Neutrophils, Absolute 09/03/2020 6.97  1.70 - 7.00 10*3/mm3 Final   • Lymphocytes, Absolute 09/03/2020 2.47  0.70 - 3.10 10*3/mm3 Final   • Monocytes, Absolute 09/03/2020 0.75  0.10 - 0.90 10*3/mm3 Final   • Eosinophils, Absolute 09/03/2020 0.13  0.00 - 0.40 10*3/mm3 Final   • Basophils, Absolute 09/03/2020 0.05  0.00 - 0.20 10*3/mm3 Final   • Immature Grans, Absolute 09/03/2020 0.04  0.00 - 0.05 10*3/mm3 Final   • nRBC 09/03/2020 0.0  0.0 - 0.2 /100 WBC Final

## 2020-09-18 ENCOUNTER — HOSPITAL ENCOUNTER (OUTPATIENT)
Dept: CARDIOLOGY | Facility: HOSPITAL | Age: 70
Discharge: HOME | End: 2020-09-18
Admitting: FAMILY MEDICINE

## 2020-09-18 DIAGNOSIS — I73.9 CLAUDICATION (HCC): ICD-10-CM

## 2020-09-18 LAB
BH CV LOWER ARTERIAL LEFT 2ND DIGIT SYS MAX: 69 MMHG
BH CV LOWER ARTERIAL LEFT 3RD DIGIT SYS MAX: 34 MMHG
BH CV LOWER ARTERIAL LEFT 4TH DIGIT SYS MAX: 0 MMHG
BH CV LOWER ARTERIAL LEFT 5TH DIGIT SYS MAX: 0 MMHG
BH CV LOWER ARTERIAL LEFT ABI RATIO: 0.86
BH CV LOWER ARTERIAL LEFT DORSALIS PEDIS SYS MAX: 125 MMHG
BH CV LOWER ARTERIAL LEFT GREAT TOE SYS MAX: 31 MMHG
BH CV LOWER ARTERIAL LEFT HIGH THIGH SYS MAX: 134 MMHG
BH CV LOWER ARTERIAL LEFT LOW THIGH SYS MAX: 135 MMHG
BH CV LOWER ARTERIAL LEFT POPLITEAL SYS MAX: 125 MMHG
BH CV LOWER ARTERIAL LEFT POST EX ABI RATIO: 0.95
BH CV LOWER ARTERIAL LEFT POST TIBIAL SYS MAX: 130 MMHG
BH CV LOWER ARTERIAL LEFT TBI RATIO: 0
BH CV LOWER ARTERIAL RIGHT ABI RATIO: 0.62
BH CV LOWER ARTERIAL RIGHT DORSALIS PEDIS SYS MAX: 84 MMHG
BH CV LOWER ARTERIAL RIGHT GREAT TOE SYS MAX: 60 MMHG
BH CV LOWER ARTERIAL RIGHT HIGH THIGH SYS MAX: 104 MMHG
BH CV LOWER ARTERIAL RIGHT LOW THIGH SYS MAX: 99 MMHG
BH CV LOWER ARTERIAL RIGHT POPLITEAL SYS MAX: 95 MMHG
BH CV LOWER ARTERIAL RIGHT POST EX ABI RATIO: 0.49
BH CV LOWER ARTERIAL RIGHT POST TIBIAL SYS MAX: 94 MMHG
BH CV LOWER ARTERIAL RIGHT TBI RATIO: 0.4
UPPER ARTERIAL RIGHT ARM BRACHIAL SYS MAX: 151 MMHG

## 2020-09-18 PROCEDURE — 93924 LWR XTR VASC STDY BILAT: CPT

## 2020-09-22 ENCOUNTER — TELEPHONE (OUTPATIENT)
Dept: INTERNAL MEDICINE | Facility: CLINIC | Age: 70
End: 2020-09-22

## 2020-09-22 NOTE — TELEPHONE ENCOUNTER
Patient called in returning a call to [facility]. Unable to warm transfer to office.    Best call back # [phone]

## 2020-09-23 ENCOUNTER — TELEPHONE (OUTPATIENT)
Dept: INTERNAL MEDICINE | Facility: CLINIC | Age: 70
End: 2020-09-23

## 2020-09-23 NOTE — TELEPHONE ENCOUNTER
Patient stated that she called Dr. Orourke to schedule an appointment bit was told that they needed a referral.  This will be re-sent.

## 2020-10-21 DIAGNOSIS — E78.2 MIXED HYPERLIPIDEMIA: Primary | ICD-10-CM

## 2020-10-21 RX ORDER — ATORVASTATIN CALCIUM 40 MG/1
40 TABLET, FILM COATED ORAL DAILY
Qty: 30 TABLET | Refills: 2 | Status: SHIPPED | OUTPATIENT
Start: 2020-10-21 | End: 2020-12-08 | Stop reason: SDUPTHER

## 2020-10-27 ENCOUNTER — LAB (OUTPATIENT)
Dept: LAB | Facility: HOSPITAL | Age: 70
End: 2020-10-27

## 2020-10-27 LAB
ALBUMIN SERPL-MCNC: 4.3 G/DL (ref 3.5–5.2)
ALBUMIN/GLOB SERPL: 1.7 G/DL
ALP SERPL-CCNC: 77 U/L (ref 39–117)
ALT SERPL W P-5'-P-CCNC: 14 U/L (ref 1–33)
ANION GAP SERPL CALCULATED.3IONS-SCNC: 11.5 MMOL/L (ref 5–15)
AST SERPL-CCNC: 20 U/L (ref 1–32)
BILIRUB SERPL-MCNC: 0.6 MG/DL (ref 0–1.2)
BUN SERPL-MCNC: 16 MG/DL (ref 8–23)
BUN/CREAT SERPL: 12.5 (ref 7–25)
CALCIUM SPEC-SCNC: 9.9 MG/DL (ref 8.6–10.5)
CHLORIDE SERPL-SCNC: 105 MMOL/L (ref 98–107)
CHOLEST SERPL-MCNC: 135 MG/DL (ref 0–200)
CO2 SERPL-SCNC: 25.5 MMOL/L (ref 22–29)
CREAT SERPL-MCNC: 1.28 MG/DL (ref 0.57–1)
GFR SERPL CREATININE-BSD FRML MDRD: 41 ML/MIN/1.73
GLOBULIN UR ELPH-MCNC: 2.5 GM/DL
GLUCOSE SERPL-MCNC: 108 MG/DL (ref 65–99)
HDLC SERPL-MCNC: 44 MG/DL (ref 40–60)
LDLC SERPL CALC-MCNC: 71 MG/DL (ref 0–100)
LDLC/HDLC SERPL: 1.56 {RATIO}
POTASSIUM SERPL-SCNC: 4.5 MMOL/L (ref 3.5–5.2)
PROT SERPL-MCNC: 6.8 G/DL (ref 6–8.5)
SODIUM SERPL-SCNC: 142 MMOL/L (ref 136–145)
TRIGL SERPL-MCNC: 111 MG/DL (ref 0–150)
VLDLC SERPL-MCNC: 20 MG/DL (ref 5–40)

## 2020-10-27 PROCEDURE — 36415 COLL VENOUS BLD VENIPUNCTURE: CPT | Performed by: FAMILY MEDICINE

## 2020-10-27 PROCEDURE — 80053 COMPREHEN METABOLIC PANEL: CPT | Performed by: FAMILY MEDICINE

## 2020-10-27 PROCEDURE — 80061 LIPID PANEL: CPT | Performed by: FAMILY MEDICINE

## 2020-10-28 NOTE — PROGRESS NOTES
Labs, negative effect of kidney function recommend repeat BMP 1 month confirm patient has  appointment with vascular surgery

## 2020-10-30 ENCOUNTER — TELEPHONE (OUTPATIENT)
Dept: INTERNAL MEDICINE | Facility: CLINIC | Age: 70
End: 2020-10-30

## 2020-10-30 DIAGNOSIS — R79.89 ELEVATED SERUM CREATININE: Primary | ICD-10-CM

## 2020-11-04 ENCOUNTER — OFFICE VISIT (OUTPATIENT)
Dept: INTERNAL MEDICINE | Facility: CLINIC | Age: 70
End: 2020-11-04

## 2020-11-04 DIAGNOSIS — F17.200 TOBACCO USE DISORDER: ICD-10-CM

## 2020-11-04 DIAGNOSIS — I10 ESSENTIAL HYPERTENSION: Primary | ICD-10-CM

## 2020-11-04 DIAGNOSIS — F33.41 RECURRENT MAJOR DEPRESSIVE DISORDER, IN PARTIAL REMISSION (HCC): ICD-10-CM

## 2020-11-04 DIAGNOSIS — E78.2 MIXED HYPERLIPIDEMIA: ICD-10-CM

## 2020-11-04 DIAGNOSIS — I65.23 BILATERAL CAROTID ARTERY STENOSIS: ICD-10-CM

## 2020-11-04 PROCEDURE — 99214 OFFICE O/P EST MOD 30 MIN: CPT | Performed by: FAMILY MEDICINE

## 2020-11-04 NOTE — PROGRESS NOTES
Laila Lea is a 70 y.o. female.  Video      Assessment/Plan   Problems Addressed this Visit        Cardiovascular and Mediastinum    Mixed hyperlipidemia    Essential hypertension - Primary    Bilateral carotid artery stenosis       Other    Depression    Relevant Medications    sertraline (Zoloft) 50 MG tablet    Tobacco use disorder      Diagnoses       Codes Comments    Essential hypertension    -  Primary ICD-10-CM: I10  ICD-9-CM: 401.9     Mixed hyperlipidemia     ICD-10-CM: E78.2  ICD-9-CM: 272.2     Bilateral carotid artery stenosis     ICD-10-CM: I65.23  ICD-9-CM: 433.10, 433.30     Tobacco use disorder     ICD-10-CM: F17.200  ICD-9-CM: 305.1     Episode of recurrent major depressive disorder, unspecified depression episode severity (CMS/HCC)     ICD-10-CM: F33.9  ICD-9-CM: 296.30            Patient is going to stop smoking educated regarding nicotine as well he is in Nicorette to wean off nicotine  Monitor blood pressure goals less than 140/90, continue present treatment of hyperlipidemia  Add sertraline for depression  Patient request consultation with psychiatry    Return in about 1 month (around 12/4/2020), or if symptoms worsen or fail to improve, for Recheck.      Chief Complaint   Patient presents with   • Depression   • Hypertension   • Hyperlipidemia   • Peripheral Vascular Disease     Social History     Tobacco Use   • Smoking status: Current Every Day Smoker     Packs/day: 1.00     Years: 50.00     Pack years: 50.00   • Smokeless tobacco: Never Used   Substance Use Topics   • Alcohol use: No     Comment: stopped drinking   • Drug use: Not on file       History of Present Illness   Patient appointment discuss chronic medical problems of depression hypertension hyperlipidemia peripheral vascular disease she has had consultation with vascular surgery  Blood pressures well controlled no unwanted side effects of statin therapy  She says she is quit smoking and using Nicorette gum we discussed  how to would then continue weaning off Nicorette gum as well  He is not taking any antidepressant she is having suffered from depression her whole life she did not feel the benefit of the duloxetine was contributing  Resume she is not taking any medication she sleeps very well    The following portions of the patient's history were reviewed and updated as appropriate:PMHroutine: Social history , Allergies, Current Medications, Active Problem List and Health Maintenance    Review of Systems   Constitutional: Negative.    HENT: Negative.    Respiratory: Negative.    Cardiovascular: Negative.    Neurological: Negative.    Hematological: Negative.    Psychiatric/Behavioral: Positive for dysphoric mood.       Objective   There were no vitals filed for this visit.  There is no height or weight on file to calculate BMI.  Physical Exam  Reviewed Data:  Orders Only on 10/21/2020   Component Date Value Ref Range Status   • Total Cholesterol 10/27/2020 135  0 - 200 mg/dL Final   • Triglycerides 10/27/2020 111  0 - 150 mg/dL Final   • HDL Cholesterol 10/27/2020 44  40 - 60 mg/dL Final   • LDL Cholesterol  10/27/2020 71  0 - 100 mg/dL Final   • VLDL Cholesterol 10/27/2020 20  5 - 40 mg/dL Final   • LDL/HDL Ratio 10/27/2020 1.56   Final   • Glucose 10/27/2020 108* 65 - 99 mg/dL Final   • BUN 10/27/2020 16  8 - 23 mg/dL Final   • Creatinine 10/27/2020 1.28* 0.57 - 1.00 mg/dL Final   • Sodium 10/27/2020 142  136 - 145 mmol/L Final   • Potassium 10/27/2020 4.5  3.5 - 5.2 mmol/L Final   • Chloride 10/27/2020 105  98 - 107 mmol/L Final   • CO2 10/27/2020 25.5  22.0 - 29.0 mmol/L Final   • Calcium 10/27/2020 9.9  8.6 - 10.5 mg/dL Final   • Total Protein 10/27/2020 6.8  6.0 - 8.5 g/dL Final   • Albumin 10/27/2020 4.30  3.50 - 5.20 g/dL Final   • ALT (SGPT) 10/27/2020 14  1 - 33 U/L Final   • AST (SGOT) 10/27/2020 20  1 - 32 U/L Final   • Alkaline Phosphatase 10/27/2020 77  39 - 117 U/L Final   • Total Bilirubin 10/27/2020 0.6  0.0 - 1.2  mg/dL Final   • eGFR Non African Amer 10/27/2020 41* >60 mL/min/1.73 Final   • Globulin 10/27/2020 2.5  gm/dL Final   • A/G Ratio 10/27/2020 1.7  g/dL Final   • BUN/Creatinine Ratio 10/27/2020 12.5  7.0 - 25.0 Final   • Anion Gap 10/27/2020 11.5  5.0 - 15.0 mmol/L Final

## 2020-11-17 ENCOUNTER — TELEPHONE (OUTPATIENT)
Dept: INTERNAL MEDICINE | Facility: CLINIC | Age: 70
End: 2020-11-17

## 2020-11-17 NOTE — TELEPHONE ENCOUNTER
Patient states that her leg hurts from the knee down but ankle/foot feels numb, white, extremely cold, very painful especially when she touches the foot. She said that it feels like frost bite.  Her left leg gets numb all the way up to the hip. She also has blood in her stool since Saturday and it is very painful to have a bowel movement.   Patient fell while on the phone. She said that the medication makes her feels woozy.  She did not think that she broke anything but she will call EMS and have them take her to the hospital.

## 2020-11-17 NOTE — TELEPHONE ENCOUNTER
PATIENT IS CALLING IN SHE WOULD LIKE TO GET A CALLBACK FROM ANNABEL.    SHE STATES SHE IS IN SEVERE PAIN WITH HER LEFT FOOT AND WOULD A CALLBACK TO DISCUSS.      CALLBACK NUMBER IS:  [phone]

## 2020-12-04 ENCOUNTER — TELEPHONE (OUTPATIENT)
Dept: INTERNAL MEDICINE | Facility: CLINIC | Age: 70
End: 2020-12-04

## 2020-12-04 NOTE — TELEPHONE ENCOUNTER
PATIENT NEEDS A REVIEW OF HER MEDICATIONS THAT HAVE BEEN PRESCRIBED. SHE STATES IT SHOWS SHE IS ON THREE DIFFERENT BLOOD PRESSURE MEDICATIONS.    PLEASE CALL PATIENT -[phone].

## 2020-12-07 ENCOUNTER — TELEPHONE (OUTPATIENT)
Dept: INTERNAL MEDICINE | Facility: CLINIC | Age: 70
End: 2020-12-07

## 2020-12-07 NOTE — TELEPHONE ENCOUNTER
Patient notified but stated that the visiting nurse is with her now and she  Would like to know why she is on the three medications.  Patient would like for Dr. Bourgeois to call her.

## 2020-12-08 ENCOUNTER — TELEPHONE (OUTPATIENT)
Dept: INTERNAL MEDICINE | Facility: CLINIC | Age: 70
End: 2020-12-08

## 2020-12-08 NOTE — TELEPHONE ENCOUNTER
Patient called to speak to [place]. Does not want to do a follow up appt, since she is at home with a home health nurse. Requested a call back from [place], if can speak to the home health nurse to review those medications.     [phone]

## 2020-12-08 NOTE — TELEPHONE ENCOUNTER
Patient notified that Dr. Bourgeois would like for her to schedule an appointment.  Telephone appointment scheduled - patient is not able to do a virtual visit.

## 2020-12-08 NOTE — TELEPHONE ENCOUNTER
Patient notified and expressed understanding.  She does not want to come into the office at this time so a telephone visit was scheduled - she does not have a way to do a virtual appointment.

## 2020-12-09 ENCOUNTER — OFFICE VISIT (OUTPATIENT)
Dept: INTERNAL MEDICINE | Facility: CLINIC | Age: 70
End: 2020-12-09

## 2020-12-09 ENCOUNTER — TELEPHONE (OUTPATIENT)
Dept: INTERNAL MEDICINE | Facility: CLINIC | Age: 70
End: 2020-12-09

## 2020-12-09 DIAGNOSIS — F41.9 ANXIETY: ICD-10-CM

## 2020-12-09 DIAGNOSIS — F17.219 CIGARETTE NICOTINE DEPENDENCE WITH NICOTINE-INDUCED DISORDER: ICD-10-CM

## 2020-12-09 DIAGNOSIS — E78.2 MIXED HYPERLIPIDEMIA: ICD-10-CM

## 2020-12-09 DIAGNOSIS — I73.9 PERIPHERAL VASCULAR DISEASE WITH CLAUDICATION (HCC): ICD-10-CM

## 2020-12-09 DIAGNOSIS — I10 ESSENTIAL HYPERTENSION: Primary | ICD-10-CM

## 2020-12-09 DIAGNOSIS — F17.200 TOBACCO USE DISORDER: ICD-10-CM

## 2020-12-09 PROCEDURE — 99443 PR PHYS/QHP TELEPHONE EVALUATION 21-30 MIN: CPT | Performed by: FAMILY MEDICINE

## 2020-12-09 RX ORDER — PANTOPRAZOLE SODIUM 40 MG/1
40 TABLET, DELAYED RELEASE ORAL DAILY
Qty: 30 TABLET | Refills: 0 | Status: SHIPPED | OUTPATIENT
Start: 2020-12-09 | End: 2020-12-09

## 2020-12-09 RX ORDER — HYDROCODONE BITARTRATE AND ACETAMINOPHEN 5; 325 MG/1; MG/1
1 TABLET ORAL EVERY 6 HOURS PRN
Qty: 18 TABLET | Refills: 0
Start: 2020-12-09 | End: 2021-09-09

## 2020-12-09 RX ORDER — ATORVASTATIN CALCIUM 40 MG/1
40 TABLET, FILM COATED ORAL DAILY
Qty: 90 TABLET | Refills: 2 | Status: SHIPPED | OUTPATIENT
Start: 2020-12-09 | End: 2021-06-16

## 2020-12-09 RX ORDER — SERTRALINE HYDROCHLORIDE 100 MG/1
100 TABLET, FILM COATED ORAL DAILY
Qty: 90 TABLET | Refills: 2 | Status: SHIPPED | OUTPATIENT
Start: 2020-12-09 | End: 2021-06-16

## 2020-12-09 RX ORDER — GABAPENTIN 100 MG/1
100 CAPSULE ORAL 3 TIMES DAILY
Qty: 90 CAPSULE | Refills: 3
Start: 2020-12-09 | End: 2021-09-09

## 2020-12-09 RX ORDER — PANTOPRAZOLE SODIUM 40 MG/1
40 TABLET, DELAYED RELEASE ORAL DAILY
Qty: 90 TABLET | Refills: 0 | Status: SHIPPED | OUTPATIENT
Start: 2020-12-09 | End: 2021-01-12

## 2020-12-09 NOTE — TELEPHONE ENCOUNTER
[facility] called to get refills of Klonopin for this patient.    [id number] [address] AT [facility] [address] [facility] [place] [phone] [facility] [phone] FX

## 2020-12-09 NOTE — PROGRESS NOTES
Laila Lea is a 70 y.o. female.  This visit has been rescheduled as a phone visit to comply with patient safety concerns in accordance with CDC recommendations. Total time of discussion was 45 minutes.    You have chosen to receive care through a telephone visit. Do you consent to use a telephone visit for your medical care today? Yes      Assessment/Plan   Problems Addressed this Visit        Cardiovascular and Mediastinum    Mixed hyperlipidemia    Essential hypertension - Primary    Peripheral vascular disease with claudication (CMS/[facility])       Other    Nicotine dependence    Tobacco use disorder    Anxiety      Diagnoses       Codes Comments    Essential hypertension    -  Primary ICD-10-CM: I10  ICD-9-CM: 401.9     Cigarette nicotine dependence with nicotine-induced disorder     ICD-10-CM: F17.219  ICD-9-CM: 292.9     Anxiety     ICD-10-CM: F41.9  ICD-9-CM: 300.00     Mixed hyperlipidemia     ICD-10-CM: E78.2  ICD-9-CM: 272.2     Tobacco use disorder     ICD-10-CM: F17.200  ICD-9-CM: 305.1     Peripheral vascular disease with claudication (CMS/HCC)     ICD-10-CM: I73.9  ICD-9-CM: 443.9            Hypertension continue lisinopril metoprolol amlodipine  Hyperlipidemia continue atorvastatin  GERD prevention Protonix 1 month  Systemic vascular disease aspirin daily  Anxiety restart Zoloft 100 mg daily  Smoking cessation with nicotine patch progressing to off      Return in about 1 month (around 1/9/2021), or if symptoms worsen or fail to improve, for Recheck.      Chief Complaint   Patient presents with   • Hypertension   • Hyperlipidemia   • Peripheral Vascular Disease     Social History     Tobacco Use   • Smoking status: Current Every Day Smoker     Packs/day: 1.00     Years: 50.00     Pack years: 50.00   • Smokeless tobacco: Never Used   Substance Use Topics   • Alcohol use: No     Comment: stopped drinking   • Drug use: Not on file       History of Present Illness   Patient follows up for ongoing  management of chronic problems hypertension hyperlipidemia systemic vascular disease GERD anxiety tobacco abuse.  She underwent vascular bypass surgery [facility] discharge November 23 she is doing well with home and family support we reconciled her medications and current list is documented she has no chest pain no shortness of breath wound is healing nicely she relates and is on minimally amount of narcotic pain medication she does have some increased anxiety as she is trying to quit smoking she is using nicotine supplements.  He was discharged with PPI for suspected stress during hospitalization she relates she had colonoscopy with some rectal bleeding which was revealed by a polyp.  Is not having more bleeding  The following portions of the patient's history were reviewed and updated as appropriate:PMHroutine: Social history , Allergies, Current Medications, Active Problem List and Health Maintenance    Review of Systems   Constitutional: Negative.    HENT: Negative.    Respiratory: Negative.    Cardiovascular: Negative.    Genitourinary: Negative.    Musculoskeletal: Positive for myalgias.   Skin: Negative.    Psychiatric/Behavioral: Positive for sleep disturbance. The patient is nervous/anxious.        Objective   There were no vitals filed for this visit.  There is no height or weight on file to calculate BMI.  Physical Exam  Reviewed Data:  No visits with results within 1 Month(s) from this visit.   Latest known visit with results is:   Orders Only on 10/21/2020   Component Date Value Ref Range Status   • Total Cholesterol 10/27/2020 135  0 - 200 mg/dL Final   • Triglycerides 10/27/2020 111  0 - 150 mg/dL Final   • HDL Cholesterol 10/27/2020 44  40 - 60 mg/dL Final   • LDL Cholesterol  10/27/2020 71  0 - 100 mg/dL Final   • VLDL Cholesterol 10/27/2020 20  5 - 40 mg/dL Final   • LDL/HDL Ratio 10/27/2020 1.56   Final   • Glucose 10/27/2020 108* 65 - 99 mg/dL Final   • BUN 10/27/2020 16  8 - 23 mg/dL Final    • Creatinine 10/27/2020 1.28* 0.57 - 1.00 mg/dL Final   • Sodium 10/27/2020 142  136 - 145 mmol/L Final   • Potassium 10/27/2020 4.5  3.5 - 5.2 mmol/L Final   • Chloride 10/27/2020 105  98 - 107 mmol/L Final   • CO2 10/27/2020 25.5  22.0 - 29.0 mmol/L Final   • Calcium 10/27/2020 9.9  8.6 - 10.5 mg/dL Final   • Total Protein 10/27/2020 6.8  6.0 - 8.5 g/dL Final   • Albumin 10/27/2020 4.30  3.50 - 5.20 g/dL Final   • ALT (SGPT) 10/27/2020 14  1 - 33 U/L Final   • AST (SGOT) 10/27/2020 20  1 - 32 U/L Final   • Alkaline Phosphatase 10/27/2020 77  39 - 117 U/L Final   • Total Bilirubin 10/27/2020 0.6  0.0 - 1.2 mg/dL Final   • eGFR Non African Amer 10/27/2020 41* >60 mL/min/1.73 Final   • Globulin 10/27/2020 2.5  gm/dL Final   • A/G Ratio 10/27/2020 1.7  g/dL Final   • BUN/Creatinine Ratio 10/27/2020 12.5  7.0 - 25.0 Final   • Anion Gap 10/27/2020 11.5  5.0 - 15.0 mmol/L Final

## 2020-12-10 ENCOUNTER — TELEPHONE (OUTPATIENT)
Dept: INTERNAL MEDICINE | Facility: CLINIC | Age: 70
End: 2020-12-10

## 2020-12-10 NOTE — TELEPHONE ENCOUNTER
----- Message from Verenice Zhu CMA sent at 12/10/2020  1:15 PM EST -----  Regarding: Prescription Question  Contact: [phone]  .    ----- Message -----  From: Laila Lea  Sent: 12/10/2020  12:53 PM EST  To: [facility]  Subject: Prescription Question                            Hi Dr. Bourgeois,  I thought when we spoke on the phone yesterday, we had discussed prescribing me an anxiety medication. But my pharmacy said they had not received an order for one. I really am feeling very anxious at home alone in the evenings. Please let me know what we can do to help alleviate this issue.   Thank you,   Laila Lea

## 2021-01-12 RX ORDER — PANTOPRAZOLE SODIUM 40 MG/1
40 TABLET, DELAYED RELEASE ORAL DAILY
Qty: 90 TABLET | Refills: 1 | Status: SHIPPED | OUTPATIENT
Start: 2021-01-12 | End: 2021-09-09

## 2021-01-17 RX ORDER — METOPROLOL SUCCINATE 25 MG/1
25 TABLET, EXTENDED RELEASE ORAL DAILY
Qty: 30 TABLET | Refills: 6 | Status: CANCELLED | OUTPATIENT
Start: 2021-01-17

## 2021-01-19 RX ORDER — METOPROLOL SUCCINATE 25 MG/1
25 TABLET, EXTENDED RELEASE ORAL DAILY
Qty: 30 TABLET | Refills: 6 | Status: SHIPPED | OUTPATIENT
Start: 2021-01-19 | End: 2021-02-25

## 2021-01-22 DIAGNOSIS — I10 ESSENTIAL HYPERTENSION: ICD-10-CM

## 2021-01-22 RX ORDER — AMLODIPINE BESYLATE 5 MG/1
5 TABLET ORAL DAILY
Qty: 30 TABLET | Refills: 3 | Status: SHIPPED | OUTPATIENT
Start: 2021-01-22 | End: 2021-06-23

## 2021-02-25 RX ORDER — METOPROLOL SUCCINATE 25 MG/1
25 TABLET, EXTENDED RELEASE ORAL DAILY
Qty: 30 TABLET | Refills: 6 | Status: SHIPPED | OUTPATIENT
Start: 2021-02-25 | End: 2021-09-09 | Stop reason: SDUPTHER

## 2021-03-02 DIAGNOSIS — Z23 IMMUNIZATION DUE: ICD-10-CM

## 2021-04-27 RX ORDER — LISINOPRIL 40 MG/1
40 TABLET ORAL DAILY
Qty: 90 TABLET | Refills: 3 | Status: SHIPPED | OUTPATIENT
Start: 2021-04-27 | End: 2022-01-13 | Stop reason: SDUPTHER

## 2021-06-16 RX ORDER — SERTRALINE HYDROCHLORIDE 100 MG/1
100 TABLET, FILM COATED ORAL DAILY
Qty: 90 TABLET | Refills: 2 | Status: SHIPPED | OUTPATIENT
Start: 2021-06-16 | End: 2021-09-09

## 2021-06-16 RX ORDER — ATORVASTATIN CALCIUM 40 MG/1
40 TABLET, FILM COATED ORAL DAILY
Qty: 90 TABLET | Refills: 2 | Status: SHIPPED | OUTPATIENT
Start: 2021-06-16 | End: 2022-01-13 | Stop reason: SDUPTHER

## 2021-06-21 DIAGNOSIS — I10 ESSENTIAL HYPERTENSION: ICD-10-CM

## 2021-06-23 RX ORDER — AMLODIPINE BESYLATE 5 MG/1
5 TABLET ORAL DAILY
Qty: 30 TABLET | Refills: 3 | Status: SHIPPED | OUTPATIENT
Start: 2021-06-23 | End: 2021-09-09

## 2021-09-09 ENCOUNTER — OFFICE VISIT (OUTPATIENT)
Dept: INTERNAL MEDICINE | Facility: CLINIC | Age: 71
End: 2021-09-09

## 2021-09-09 ENCOUNTER — LAB (OUTPATIENT)
Dept: LAB | Facility: HOSPITAL | Age: 71
End: 2021-09-09

## 2021-09-09 VITALS
DIASTOLIC BLOOD PRESSURE: 64 MMHG | OXYGEN SATURATION: 98 % | SYSTOLIC BLOOD PRESSURE: 100 MMHG | HEART RATE: 111 BPM | BODY MASS INDEX: 20.81 KG/M2 | HEIGHT: 68 IN | WEIGHT: 137.3 LBS

## 2021-09-09 DIAGNOSIS — F17.219 CIGARETTE NICOTINE DEPENDENCE WITH NICOTINE-INDUCED DISORDER: ICD-10-CM

## 2021-09-09 DIAGNOSIS — G47.09 OTHER INSOMNIA: ICD-10-CM

## 2021-09-09 DIAGNOSIS — R73.9 HYPERGLYCEMIA: ICD-10-CM

## 2021-09-09 DIAGNOSIS — E78.2 MIXED HYPERLIPIDEMIA: ICD-10-CM

## 2021-09-09 DIAGNOSIS — F41.9 ANXIETY: ICD-10-CM

## 2021-09-09 DIAGNOSIS — I73.9 PERIPHERAL VASCULAR DISEASE WITH CLAUDICATION (HCC): ICD-10-CM

## 2021-09-09 DIAGNOSIS — I63.532 CEREBROVASCULAR ACCIDENT (CVA) DUE TO OCCLUSION OF LEFT POSTERIOR CEREBRAL ARTERY (HCC): ICD-10-CM

## 2021-09-09 DIAGNOSIS — F33.41 RECURRENT MAJOR DEPRESSIVE DISORDER, IN PARTIAL REMISSION (HCC): ICD-10-CM

## 2021-09-09 DIAGNOSIS — Z00.00 MEDICARE ANNUAL WELLNESS VISIT, SUBSEQUENT: Primary | ICD-10-CM

## 2021-09-09 DIAGNOSIS — F17.200 TOBACCO USE DISORDER: ICD-10-CM

## 2021-09-09 DIAGNOSIS — I10 ESSENTIAL HYPERTENSION: ICD-10-CM

## 2021-09-09 PROBLEM — I63.30 CEREBROVASCULAR ACCIDENT (CVA) DUE TO THROMBOSIS OF CEREBRAL ARTERY (HCC): Status: ACTIVE | Noted: 2021-09-09

## 2021-09-09 LAB
ALBUMIN SERPL-MCNC: 4.8 G/DL (ref 3.5–5.2)
ALBUMIN/GLOB SERPL: 2.1 G/DL
ALP SERPL-CCNC: 70 U/L (ref 39–117)
ALT SERPL W P-5'-P-CCNC: 13 U/L (ref 1–33)
ANION GAP SERPL CALCULATED.3IONS-SCNC: 14.4 MMOL/L (ref 5–15)
AST SERPL-CCNC: 23 U/L (ref 1–32)
BILIRUB SERPL-MCNC: 0.3 MG/DL (ref 0–1.2)
BUN SERPL-MCNC: 22 MG/DL (ref 8–23)
BUN/CREAT SERPL: 18.6 (ref 7–25)
CALCIUM SPEC-SCNC: 10.5 MG/DL (ref 8.6–10.5)
CHLORIDE SERPL-SCNC: 107 MMOL/L (ref 98–107)
CHOLEST SERPL-MCNC: 142 MG/DL (ref 0–200)
CO2 SERPL-SCNC: 25.6 MMOL/L (ref 22–29)
CREAT SERPL-MCNC: 1.18 MG/DL (ref 0.57–1)
GFR SERPL CREATININE-BSD FRML MDRD: 45 ML/MIN/1.73
GLOBULIN UR ELPH-MCNC: 2.3 GM/DL
GLUCOSE SERPL-MCNC: 93 MG/DL (ref 65–99)
HBA1C MFR BLD: 5.4 % (ref 4.8–5.6)
HDLC SERPL-MCNC: 58 MG/DL (ref 40–60)
LDLC SERPL CALC-MCNC: 59 MG/DL (ref 0–100)
LDLC/HDLC SERPL: 0.93 {RATIO}
POTASSIUM SERPL-SCNC: 4.4 MMOL/L (ref 3.5–5.2)
PROT SERPL-MCNC: 7.1 G/DL (ref 6–8.5)
SODIUM SERPL-SCNC: 147 MMOL/L (ref 136–145)
TRIGL SERPL-MCNC: 150 MG/DL (ref 0–150)
VLDLC SERPL-MCNC: 25 MG/DL (ref 5–40)

## 2021-09-09 PROCEDURE — 96160 PT-FOCUSED HLTH RISK ASSMT: CPT | Performed by: FAMILY MEDICINE

## 2021-09-09 PROCEDURE — G0439 PPPS, SUBSEQ VISIT: HCPCS | Performed by: FAMILY MEDICINE

## 2021-09-09 PROCEDURE — 1125F AMNT PAIN NOTED PAIN PRSNT: CPT | Performed by: FAMILY MEDICINE

## 2021-09-09 PROCEDURE — 99214 OFFICE O/P EST MOD 30 MIN: CPT | Performed by: FAMILY MEDICINE

## 2021-09-09 PROCEDURE — 1170F FXNL STATUS ASSESSED: CPT | Performed by: FAMILY MEDICINE

## 2021-09-09 PROCEDURE — 1160F RVW MEDS BY RX/DR IN RCRD: CPT | Performed by: FAMILY MEDICINE

## 2021-09-09 PROCEDURE — 36415 COLL VENOUS BLD VENIPUNCTURE: CPT | Performed by: FAMILY MEDICINE

## 2021-09-09 PROCEDURE — 80053 COMPREHEN METABOLIC PANEL: CPT | Performed by: FAMILY MEDICINE

## 2021-09-09 PROCEDURE — 80061 LIPID PANEL: CPT | Performed by: FAMILY MEDICINE

## 2021-09-09 PROCEDURE — 83036 HEMOGLOBIN GLYCOSYLATED A1C: CPT | Performed by: FAMILY MEDICINE

## 2021-09-09 RX ORDER — CLONAZEPAM 0.5 MG/1
0.5 TABLET ORAL 2 TIMES DAILY PRN
Qty: 30 TABLET | Refills: 0 | Status: SHIPPED | OUTPATIENT
Start: 2021-09-09 | End: 2021-10-07 | Stop reason: SINTOL

## 2021-09-09 RX ORDER — DULOXETIN HYDROCHLORIDE 30 MG/1
30 CAPSULE, DELAYED RELEASE ORAL DAILY
Qty: 30 CAPSULE | Refills: 3 | Status: SHIPPED | OUTPATIENT
Start: 2021-09-09 | End: 2022-01-06 | Stop reason: SDUPTHER

## 2021-09-09 RX ORDER — CALCIUM CARBONATE/VITAMIN D3 500-10/5ML
1 LIQUID (ML) ORAL DAILY
COMMUNITY

## 2021-09-09 RX ORDER — ALPRAZOLAM 0.5 MG/1
TABLET ORAL
COMMUNITY
End: 2021-09-09 | Stop reason: ALTCHOICE

## 2021-09-09 RX ORDER — METOPROLOL SUCCINATE 25 MG/1
25 TABLET, EXTENDED RELEASE ORAL DAILY
Qty: 30 TABLET | Refills: 6 | Status: SHIPPED | OUTPATIENT
Start: 2021-09-09 | End: 2022-01-12

## 2021-09-09 RX ORDER — AMOXICILLIN 500 MG
1200 CAPSULE ORAL DAILY
COMMUNITY

## 2021-09-09 RX ORDER — IBUPROFEN 200 MG
200 TABLET ORAL EVERY 6 HOURS PRN
COMMUNITY
End: 2021-09-09 | Stop reason: SINTOL

## 2021-09-09 RX ORDER — CARVEDILOL 3.12 MG/1
TABLET ORAL
COMMUNITY
End: 2021-09-09

## 2021-09-09 NOTE — PROGRESS NOTES
The ABCs of the Annual Wellness Visit  Subsequent Medicare Wellness Visit    Chief Complaint   Patient presents with   • follow up to hypertension   • follow up to hyperlipidemia   • follow up to depression   • Medicare Wellness-subsequent      Subjective    History of Present Illness:  Laila Lea is a 71 y.o. female who presents for a Subsequent Medicare Wellness Visit.    The following portions of the patient's history were reviewed and   updated as appropriate: allergies, current medications, past family history, past medical history, past social history, past surgical history and problem list.     Compared to one year ago, the patient feels her physical   health is the same.    Compared to one year ago, the patient feels her mental   health is the same.    Recent Hospitalizations:  She was admitted within the past 365 days at [facility].       Current Medical Providers:  Patient Care Team:  James Bourgeois MD as PCP - General  James Bourgeois MD as PCP - Family Medicine    Outpatient Medications Prior to Visit   Medication Sig Dispense Refill   • acetaminophen (TYLENOL) 500 MG tablet Take 1,000 mg by mouth Daily As Needed.     • aspirin 325 MG tablet Take 325 mg by mouth Daily.     • atorvastatin (LIPITOR) 40 MG tablet TAKE 1 TABLET BY MOUTH DAILY 90 tablet 2   • AZO-CRANBERRY PO Take 1 tablet by mouth Daily.     • BIOTIN PO Take 1 tablet by mouth Daily.     • CBD (cannabidiol) oral oil Take 1 drop by mouth 2 (Two) Times a Day.     • cetirizine (ZyrTEC) 10 MG tablet Take 10 mg by mouth Daily As Needed.     • Cholecalciferol (VITAMIN D3 PO) Take 1 tablet by mouth Daily.     • Coenzyme Q10 (COQ10 PO) Take 1 tablet by mouth Daily.     • Cyanocobalamin (VITAMIN B-12 PO) Take 1 tablet by mouth Daily.     • Glucosamine-Chondroitin (OSTEO BI-FLEX REGULAR STRENGTH PO) Take 1 tablet by mouth Daily.     • lisinopril (PRINIVIL,ZESTRIL) 40 MG tablet TAKE 1 TABLET BY MOUTH DAILY 90 tablet 3   • Omega-3 Fatty  Acids (fish oil) 1200 MG capsule capsule Take 1,200 mg by mouth Daily.     • Unable to find Nervive - 1 daily     • Zinc 30 MG capsule Take 1 tablet by mouth Daily.     • ibuprofen (Advil) 200 MG tablet Take 200 mg by mouth Every 6 (Six) Hours As Needed for Mild Pain .     • sertraline (ZOLOFT) 100 MG tablet TAKE 1 TABLET BY MOUTH DAILY 90 tablet 2   • ALPRAZolam (XANAX) 0.5 MG tablet      • amLODIPine (NORVASC) 5 MG tablet TAKE 1 TABLET BY MOUTH DAILY 30 tablet 3   • carvedilol (Coreg) 3.125 MG tablet      • gabapentin (NEURONTIN) 100 MG capsule Take 1 capsule by mouth 3 (Three) Times a Day. 90 capsule 3   • HYDROcodone-acetaminophen (Norco) 5-325 MG per tablet Take 1 tablet by mouth Every 6 (Six) Hours As Needed for Moderate Pain . 18 tablet 0   • metoprolol succinate XL (TOPROL-XL) 25 MG 24 hr tablet TAKE 1 TABLET BY MOUTH DAILY 30 tablet 6   • pantoprazole (PROTONIX) 40 MG EC tablet TAKE 1 TABLET BY MOUTH DAILY 90 tablet 1   • tiZANidine (ZANAFLEX) 4 MG tablet 1-2 tablets BID prn     • traMADol (ULTRAM) 50 MG tablet TAKE ONE TABLET BY MOUTH EVERY 8 HOURS AS NEEDED FOR MODERATE PAIN 30 tablet 0     No facility-administered medications prior to visit.       No opioid medication identified on active medication list. I have reviewed chart for other potential  high risk medication/s and harmful drug interactions in the elderly.          Aspirin is on active medication list. Aspirin use is indicated based on review of current medical condition/s. Pros and cons of this therapy have been discussed today. Benefits of this medication outweigh potential harm.  Patient has been encouraged to continue taking this medication.  .      Patient Active Problem List   Diagnosis   • Osteopenia   • Tension headache   • Vitamin D deficiency   • Benign neoplasm of adrenal cortex   • Adrenal gland neoplasm   • Claudication (CMS/HCC)   • Cervical radiculopathy   • Cyst of finger   • Recurrent major depressive disorder, in partial  "remission (CMS/[facility])   • Eczema   • Fatigue   • Mixed hyperlipidemia   • Essential hypertension   • Insomnia   • Lumbar radiculopathy   • Sciatica   • Chronic bilateral low back pain without sciatica   • Hyperuricemia   • Cerebrovascular accident (CVA) due to occlusion of left posterior cerebral artery (CMS/[facility])   • Nicotine dependence   • Tobacco use disorder   • Health care maintenance   • Medicare annual wellness visit, subsequent   • Menopause   • Abdominal bruit   • Bilateral carotid artery stenosis   • Anxiety   • Peripheral vascular disease with claudication (CMS/[facility])   • Cerebrovascular accident (CVA) due to thrombosis of cerebral artery (CMS/[facility])     Advance Care Planning   Advance Directive is not on file.  ACP discussion was held with the patient during this visit. Patient has an advance directive (not in EMR), copy requested.          Objective       Vitals:    09/09/21 1234   BP: 100/64   BP Location: Right arm   Patient Position: Sitting   Cuff Size: Adult   Pulse: 111   SpO2: 98%   Weight: 62.3 kg (137 lb 4.8 oz)   Height: 172.7 cm (68\")   PainSc:   4     BMI Readings from Last 1 Encounters:   09/09/21 20.88 kg/m²   BMI is within normal parameters. No follow-up required.    Does the patient have evidence of cognitive impairment? No    Physical Exam            HEALTH RISK ASSESSMENT    Smoking Status:  Social History     Tobacco Use   Smoking Status Current Every Day Smoker   • Packs/day: 1.00   • Years: 50.00   • Pack years: 50.00   Smokeless Tobacco Never Used     Alcohol Consumption:  Social History     Substance and Sexual Activity   Alcohol Use No    Comment: stopped drinking     Fall Risk Screen:    SONIAADI Fall Risk Assessment was completed, and patient is at MODERATE risk for falls. Assessment completed on:9/9/2021    Depression Screening:  PHQ-2/PHQ-9 Depression Screening 9/9/2021   Little interest or pleasure in doing things 0   Feeling down, depressed, or hopeless 3   Trouble falling or " staying asleep, or sleeping too much 1   Feeling tired or having little energy 3   Poor appetite or overeating 0   Feeling bad about yourself - or that you are a failure or have let yourself or your family down 3   Trouble concentrating on things, such as reading the newspaper or watching television 3   Moving or speaking so slowly that other people could have noticed. Or the opposite - being so fidgety or restless that you have been moving around a lot more than usual 0   Thoughts that you would be better off dead, or of hurting yourself in some way 0   Total Score 13   If you checked off any problems, how difficult have these problems made it for you to do your work, take care of things at home, or get along with other people? Somewhat difficult       Health Habits and Functional and Cognitive Screening:  Functional & Cognitive Status 9/9/2021   Do you have difficulty preparing food and eating? Yes   Do you have difficulty bathing yourself, getting dressed or grooming yourself? Yes   Do you have difficulty using the toilet? Yes   Do you have difficulty moving around from place to place? Yes   Do you have trouble with steps or getting out of a bed or a chair? Yes   Current Diet Well Balanced Diet   Dental Exam Up to date   Eye Exam Not up to date   Exercise (times per week) 0 times per week   Current Exercises Include No Regular Exercise   Current Exercise Activities Include -   Do you need help using the phone?  No   Are you deaf or do you have serious difficulty hearing?  No   Do you need help with transportation? Yes   Do you need help shopping? No   Do you need help preparing meals?  No   Do you need help with housework?  Yes   Do you need help with laundry? No   Do you need help taking your medications? No   Do you need help managing money? No   Do you ever drive or ride in a car without wearing a seat belt? No   Have you felt unusual stress, anger or loneliness in the last month? Yes   Who do you live with?  Alone   If you need help, do you have trouble finding someone available to you? Yes   Have you been bothered in the last four weeks by sexual problems? No   Do you have difficulty concentrating, remembering or making decisions? Yes       Age-appropriate Screening Schedule:  Refer to the list below for future screening recommendations based on patient's age, sex and/or medical conditions. Orders for these recommended tests are listed in the plan section. The patient has been provided with a written plan.    Health Maintenance   Topic Date Due   • URINE MICROALBUMIN  Never done   • ZOSTER VACCINE (1 of 2) Never done   • DIABETIC FOOT EXAM  Never done   • HEMOGLOBIN A1C  07/09/2018   • DIABETIC EYE EXAM  04/11/2020   • DXA SCAN  05/28/2021   • INFLUENZA VACCINE  10/01/2021   • LIPID PANEL  10/27/2021   • TDAP/TD VACCINES  Discontinued              Assessment/Plan     CMS Preventative Services Quick Reference  Risk Factors Identified During Encounter  Cardiovascular Disease  Fall Risk-High or Moderate  Inactivity/Sedentary  Tobacco Use/Dependance (use dotphrase .tobaccocessation for documentation)  The above risks/problems have been discussed with the patient.  Follow up actions/plans if indicated are seen below in the Assessment/Plan Section.  Pertinent information has been shared with the patient in the After Visit Summary.    Diagnoses and all orders for this visit:    1. Medicare annual wellness visit, subsequent (Primary)    2. Essential hypertension  -     Comprehensive Metabolic Panel  -     metoprolol succinate XL (TOPROL-XL) 25 MG 24 hr tablet; Take 1 tablet by mouth Daily.  Dispense: 30 tablet; Refill: 6    3. Mixed hyperlipidemia  -     Lipid Panel    4. Recurrent major depressive disorder, in partial remission (CMS/[facility])  -     DULoxetine (Cymbalta) 30 MG capsule; Take 1 capsule by mouth Daily.  Dispense: 30 capsule; Refill: 3    5. Cigarette nicotine dependence with nicotine-induced disorder    6. Other  insomnia    7. Tobacco use disorder    8. Cerebrovascular accident (CVA) due to occlusion of left posterior cerebral artery (CMS/HCC)    9. Anxiety  -     clonazePAM (KlonoPIN) 0.5 MG tablet; Take 1 tablet by mouth 2 (Two) Times a Day As Needed for Anxiety.  Dispense: 30 tablet; Refill: 0    10. Hyperglycemia  -     Hemoglobin A1c    11. Peripheral vascular disease with claudication (CMS/HCC)        Follow Up:   Return in about 1 month (around 10/9/2021), or if symptoms worsen or fail to improve, for Recheck.     An After Visit Summary and PPPS were given to the patient.  Ongoing  management of chronic medical problems.

## 2021-09-09 NOTE — PROGRESS NOTES
"Chief Complaint  follow up to hypertension, follow up to hyperlipidemia, follow up to depression, and Medicare Wellness-subsequent    Subjective          Laila Lea presents to [facility]  History of Present Illness  Patient follows up for ongoing management of chronic medical problems systemic vascular disease with hypertension hyperlipidemia anxiety depression history of CVA who still has nicotine addiction smokes although has reduced considerably over the last year  Objective   Vital Signs:   /64 (BP Location: Right arm, Patient Position: Sitting, Cuff Size: Adult)   Pulse 111   Ht 172.7 cm (68\")   Wt 62.3 kg (137 lb 4.8 oz)   SpO2 98%   BMI 20.88 kg/m²     Physical Exam  Constitutional:       Appearance: Normal appearance. She is normal weight.   HENT:      Head: Normocephalic and atraumatic.   Eyes:      General: No scleral icterus.  Cardiovascular:      Rate and Rhythm: Normal rate.      Pulses: Normal pulses.      Heart sounds: Normal heart sounds.      Comments: Feet cyanotic  Abdominal:      Tenderness: There is no right CVA tenderness or left CVA tenderness.      Comments: Well-healed midline scar   Musculoskeletal:      Right lower leg: No edema.      Left lower leg: No edema.   Neurological:      Mental Status: She is alert.   Psychiatric:         Mood and Affect: Mood normal.         Behavior: Behavior normal.         Thought Content: Thought content normal.         Judgment: Judgment normal.        Result Review :     Common labs    Common Labsle 11/28/20 11/29/20 11/30/20   WBC 9.67 8.97 9.32   Hemoglobin 11.5 (A) 11.1 (A) 10.7 (A)   Hematocrit 34.8 (A) 33.9 (A) 31.6 (A)   Platelets 251 280 303   (A) Abnormal value            Data reviewed: Consultant notes Vascular surgeon femoropopliteal bypass 2020          Assessment and Plan    Diagnoses and all orders for this visit:    1. Medicare annual wellness visit, subsequent (Primary)    2. Essential " hypertension  -     Comprehensive Metabolic Panel  -     metoprolol succinate XL (TOPROL-XL) 25 MG 24 hr tablet; Take 1 tablet by mouth Daily.  Dispense: 30 tablet; Refill: 6    3. Mixed hyperlipidemia  -     Lipid Panel    4. Recurrent major depressive disorder, in partial remission (CMS/HCC)  -     DULoxetine (Cymbalta) 30 MG capsule; Take 1 capsule by mouth Daily.  Dispense: 30 capsule; Refill: 3    5. Cigarette nicotine dependence with nicotine-induced disorder    6. Other insomnia    7. Tobacco use disorder    8. Cerebrovascular accident (CVA) due to occlusion of left posterior cerebral artery (CMS/HCC)    9. Anxiety  -     clonazePAM (KlonoPIN) 0.5 MG tablet; Take 1 tablet by mouth 2 (Two) Times a Day As Needed for Anxiety.  Dispense: 30 tablet; Refill: 0    10. Hyperglycemia  -     Hemoglobin A1c    11. Peripheral vascular disease with claudication (CMS/HCC)    Smoking cessation  Follow-up results of blood work for ongoing management of associated problems  Stop sertraline start Cymbalta  Restart metoprolol tachycardia and hypertension  Continue lisinopril for hypertension  Continue atorvastatin for hyperlipidemia      Follow Up   Return in about 1 month (around 10/9/2021), or if symptoms worsen or fail to improve, for Recheck.  Patient was given instructions and counseling regarding her condition or for health maintenance advice. Please see specific information pulled into the AVS if appropriate.

## 2021-09-17 DIAGNOSIS — E87.0 SERUM SODIUM ELEVATED: Primary | ICD-10-CM

## 2021-10-07 ENCOUNTER — LAB (OUTPATIENT)
Dept: LAB | Facility: HOSPITAL | Age: 71
End: 2021-10-07

## 2021-10-07 ENCOUNTER — OFFICE VISIT (OUTPATIENT)
Dept: INTERNAL MEDICINE | Facility: CLINIC | Age: 71
End: 2021-10-07

## 2021-10-07 VITALS
OXYGEN SATURATION: 97 % | HEIGHT: 68 IN | HEART RATE: 93 BPM | WEIGHT: 138.7 LBS | BODY MASS INDEX: 21.02 KG/M2 | SYSTOLIC BLOOD PRESSURE: 130 MMHG | DIASTOLIC BLOOD PRESSURE: 62 MMHG | TEMPERATURE: 98 F | RESPIRATION RATE: 16 BRPM

## 2021-10-07 DIAGNOSIS — E87.0 HYPERNATREMIA: ICD-10-CM

## 2021-10-07 DIAGNOSIS — E78.2 MIXED HYPERLIPIDEMIA: ICD-10-CM

## 2021-10-07 DIAGNOSIS — E87.0 SERUM SODIUM ELEVATED: ICD-10-CM

## 2021-10-07 DIAGNOSIS — F17.200 TOBACCO USE DISORDER: Primary | ICD-10-CM

## 2021-10-07 DIAGNOSIS — F17.219 CIGARETTE NICOTINE DEPENDENCE WITH NICOTINE-INDUCED DISORDER: ICD-10-CM

## 2021-10-07 DIAGNOSIS — I10 ESSENTIAL HYPERTENSION: ICD-10-CM

## 2021-10-07 PROBLEM — E87.1 HYPONATREMIA: Status: ACTIVE | Noted: 2021-10-07

## 2021-10-07 LAB
ANION GAP SERPL CALCULATED.3IONS-SCNC: 10 MMOL/L (ref 5–15)
BUN SERPL-MCNC: 22 MG/DL (ref 8–23)
BUN/CREAT SERPL: 23.4 (ref 7–25)
CALCIUM SPEC-SCNC: 10.1 MG/DL (ref 8.6–10.5)
CHLORIDE SERPL-SCNC: 109 MMOL/L (ref 98–107)
CO2 SERPL-SCNC: 25 MMOL/L (ref 22–29)
CREAT SERPL-MCNC: 0.94 MG/DL (ref 0.57–1)
GFR SERPL CREATININE-BSD FRML MDRD: 59 ML/MIN/1.73
GLUCOSE SERPL-MCNC: 85 MG/DL (ref 65–99)
POTASSIUM SERPL-SCNC: 3.9 MMOL/L (ref 3.5–5.2)
SODIUM SERPL-SCNC: 144 MMOL/L (ref 136–145)

## 2021-10-07 PROCEDURE — 90662 IIV NO PRSV INCREASED AG IM: CPT | Performed by: FAMILY MEDICINE

## 2021-10-07 PROCEDURE — 80048 BASIC METABOLIC PNL TOTAL CA: CPT

## 2021-10-07 PROCEDURE — 99214 OFFICE O/P EST MOD 30 MIN: CPT | Performed by: FAMILY MEDICINE

## 2021-10-07 PROCEDURE — 36415 COLL VENOUS BLD VENIPUNCTURE: CPT

## 2021-10-07 PROCEDURE — G0008 ADMIN INFLUENZA VIRUS VAC: HCPCS | Performed by: FAMILY MEDICINE

## 2021-10-07 RX ORDER — SERTRALINE HYDROCHLORIDE 100 MG/1
TABLET, FILM COATED ORAL
COMMUNITY
Start: 2021-09-14 | End: 2021-10-07 | Stop reason: ALTCHOICE

## 2021-10-07 NOTE — PROGRESS NOTES
"Chief Complaint  Follow up to hypertension    Subjective          Laila Lea presents to [facility]  History of Present Illness  Patient follows up for ongoing management of hypertension hyponatremia hyperlipidemia chronic low back pain anxiety  At the last visit her sertraline was discontinued and she was started on Cymbalta she has no unwanted side effects and she feels she is gaining some benefit she does not take the clonazepam because it gave her too much forgetfulness  Her blood pressure is well controlled she has slight elevated and her sodium that is going to be checked today she has no headache or belly pain  Lipids well controlled atorvastatin  Objective   Vital Signs:   /62   Pulse 93   Temp 98 °F (36.7 °C)   Resp 16   Ht 172.7 cm (68\")   Wt 62.9 kg (138 lb 11.2 oz)   SpO2 97%   BMI 21.09 kg/m²     Physical Exam  Vitals and nursing note reviewed.   Constitutional:       Appearance: Normal appearance.   Cardiovascular:      Rate and Rhythm: Normal rate and regular rhythm.      Pulses: Normal pulses.      Heart sounds: Normal heart sounds.   Pulmonary:      Effort: Pulmonary effort is normal.      Breath sounds: Normal breath sounds.   Neurological:      Mental Status: She is alert.   Psychiatric:         Mood and Affect: Mood normal.         Behavior: Behavior normal.         Thought Content: Thought content normal.         Judgment: Judgment normal.        Result Review :     Common labs    Common Labsle 11/29/20 11/30/20 9/9/21 9/9/21 9/9/21      1337 1337 1337   Glucose     93   BUN     22   Creatinine     1.18 (A)   eGFR Non African Am     45 (A)   Sodium     147 (A)   Potassium     4.4   Chloride     107   Calcium     10.5   Albumin     4.80   Total Bilirubin     0.3   Alkaline Phosphatase     70   AST (SGOT)     23   ALT (SGPT)     13   WBC 8.97 9.32      Hemoglobin 11.1 (A) 10.7 (A)      Hematocrit 33.9 (A) 31.6 (A)      Platelets 280 303    "   Total Cholesterol    142    Triglycerides    150    HDL Cholesterol    58    LDL Cholesterol     59    Hemoglobin A1C   5.40     (A) Abnormal value                      Assessment and Plan    Diagnoses and all orders for this visit:    1. Tobacco use disorder (Primary)    2. Cigarette nicotine dependence with nicotine-induced disorder    3. Essential hypertension    4. Mixed hyperlipidemia    5. Hypernatremia    Other orders  -     Fluzone High-Dose 65+yrs    Recommend smoking cessation  Monitor blood pressure goals less than 140/90  Atorvastatin continue for hyperlipidemia  Follow-up results of BMP for further evaluation of elevated sodium    Follow Up   No follow-ups on file.  Patient was given instructions and counseling regarding her condition or for health maintenance advice. Please see specific information pulled into the AVS if appropriate.

## 2021-11-03 ENCOUNTER — TELEPHONE (OUTPATIENT)
Dept: INTERNAL MEDICINE | Facility: CLINIC | Age: 71
End: 2021-11-03

## 2021-11-03 NOTE — TELEPHONE ENCOUNTER
Medication requested (name and dose): Thrive Solo     Pharmacy where request should be sent: [id number] - [facility] [address] [place] [address] AT [facility] - [phone] PH - [phone]      Additional details provided by patient: PT IS OUT AND STATED THAT SHE WAS UNSURE OF MG     Best call back number: [phone]    Does the patient have less than a 3 day supply:  [x] Yes  [] No    Alisia Dunn  11/03/21, 15:24 EDT    {

## 2021-11-04 NOTE — TELEPHONE ENCOUNTER
Patient advised she cannot take Chantix due to SI- after I consulted with you I called her back to let her know. Thank you.

## 2021-12-17 ENCOUNTER — TELEPHONE (OUTPATIENT)
Dept: INTERNAL MEDICINE | Facility: CLINIC | Age: 71
End: 2021-12-17

## 2021-12-17 NOTE — TELEPHONE ENCOUNTER
Caller: Laila Lea    Relationship to patient: Self    Best call back number: [phone]    Patient is needing: PT IS CALLING IN REGARDS TO THE MEDICATION THAT SHE TAKES FOR HER ANXIETY AND DEPRESSION. (SHE DOES NOT KNOW THE NAME) SHE ST[facility] THAT [facility] MEDICATION SERTRALINE AND SHE NO LONGER TAKES THAT OLD MEDICATION. PT IS CONFUSED AS TO WHY THAT WAS FILLED INSTEAD OF THE NEW ONE THAT SHES BEEN TAKING.

## 2021-12-21 NOTE — TELEPHONE ENCOUNTER
Patient was notified that this office did not receive a refill request for the Sertraline.  She said that she requested a refill for Cymbalta.  Tried to speak with pharmacy but was on hold for over 10 minutes with 3 callers still ahead.

## 2021-12-23 NOTE — TELEPHONE ENCOUNTER
Tried to reach [facility] again but also was hold for over 20 minutes.  LMA - asking them to contact patient because she was given sertraline instead of the Cymbalta as prescribed.

## 2022-01-06 DIAGNOSIS — F33.41 RECURRENT MAJOR DEPRESSIVE DISORDER, IN PARTIAL REMISSION: ICD-10-CM

## 2022-01-06 RX ORDER — DULOXETIN HYDROCHLORIDE 30 MG/1
30 CAPSULE, DELAYED RELEASE ORAL DAILY
Qty: 30 CAPSULE | Refills: 3 | Status: SHIPPED | OUTPATIENT
Start: 2022-01-06 | End: 2022-01-13 | Stop reason: SDUPTHER

## 2022-01-06 NOTE — TELEPHONE ENCOUNTER
Caller: Laila Lea    Relationship: Self    Best call back number: [phone]     Requested Prescriptions:   Requested Prescriptions     Pending Prescriptions Disp Refills   • DULoxetine (Cymbalta) 30 MG capsule 30 capsule 3     Sig: Take 1 capsule by mouth Daily.        Pharmacy where request should be sent: [id number] [address] AT [facility] [phone] [facility] [phone] FX     Additional details provided by patient: PATIENT IS OUT OF MEDICATION     Does the patient have less than a 3 day supply:  [x] Yes  [] No    Herman Leach Rep   01/06/22 14:14 EST

## 2022-01-12 DIAGNOSIS — I10 ESSENTIAL HYPERTENSION: ICD-10-CM

## 2022-01-12 RX ORDER — METOPROLOL SUCCINATE 25 MG/1
25 TABLET, EXTENDED RELEASE ORAL DAILY
Qty: 30 TABLET | Refills: 6 | Status: SHIPPED | OUTPATIENT
Start: 2022-01-12 | End: 2022-01-13 | Stop reason: SDUPTHER

## 2022-01-13 ENCOUNTER — TELEPHONE (OUTPATIENT)
Dept: INTERNAL MEDICINE | Facility: CLINIC | Age: 72
End: 2022-01-13

## 2022-01-13 DIAGNOSIS — I10 ESSENTIAL HYPERTENSION: ICD-10-CM

## 2022-01-13 DIAGNOSIS — F33.41 RECURRENT MAJOR DEPRESSIVE DISORDER, IN PARTIAL REMISSION: ICD-10-CM

## 2022-01-13 RX ORDER — LISINOPRIL 40 MG/1
40 TABLET ORAL DAILY
Qty: 90 TABLET | Refills: 1 | Status: SHIPPED | OUTPATIENT
Start: 2022-01-13 | End: 2022-04-12

## 2022-01-13 RX ORDER — ATORVASTATIN CALCIUM 40 MG/1
40 TABLET, FILM COATED ORAL DAILY
Qty: 90 TABLET | Refills: 1 | Status: SHIPPED | OUTPATIENT
Start: 2022-01-13 | End: 2022-06-13

## 2022-01-13 RX ORDER — DULOXETIN HYDROCHLORIDE 30 MG/1
30 CAPSULE, DELAYED RELEASE ORAL DAILY
Qty: 30 CAPSULE | Refills: 5 | Status: SHIPPED | OUTPATIENT
Start: 2022-01-13 | End: 2022-04-04

## 2022-01-13 RX ORDER — METOPROLOL SUCCINATE 25 MG/1
25 TABLET, EXTENDED RELEASE ORAL DAILY
Qty: 30 TABLET | Refills: 5 | Status: SHIPPED | OUTPATIENT
Start: 2022-01-13 | End: 2022-07-07

## 2022-01-13 NOTE — TELEPHONE ENCOUNTER
Caller: DENISA    Relationship: PHARMACY    Best call back number:[phone]        Requested Prescriptions:   Requested Prescriptions     Pending Prescriptions Disp Refills   • lisinopril (PRINIVIL,ZESTRIL) 40 MG tablet 90 tablet 3     Sig: Take 1 tablet by mouth Daily.   • DULoxetine (Cymbalta) 30 MG capsule 30 capsule 3     Sig: Take 1 capsule by mouth Daily.   • metoprolol succinate XL (TOPROL-XL) 25 MG 24 hr tablet 30 tablet 6     Sig: Take 1 tablet by mouth Daily.   • atorvastatin (LIPITOR) 40 MG tablet 90 tablet 2     Sig: Take 1 tablet by mouth Daily.        Pharmacy where request should be sent: [facility] - [facility] [id number] [facility] - [phone] [facility] [phone] FX         Does the patient have less than a 3 day supply:  [x] Yes  [] No    Herman Oswald Rep   01/13/22 10:57 EST

## 2022-03-03 ENCOUNTER — TRANSCRIBE ORDERS (OUTPATIENT)
Dept: DIABETES SERVICES | Facility: HOSPITAL | Age: 72
End: 2022-03-03

## 2022-03-03 DIAGNOSIS — I10 HYPERTENSION, ESSENTIAL: Primary | ICD-10-CM

## 2022-03-28 ENCOUNTER — HOSPITAL ENCOUNTER (OUTPATIENT)
Dept: DIABETES SERVICES | Facility: HOSPITAL | Age: 72
Discharge: HOME | End: 2022-03-28
Admitting: STUDENT IN AN ORGANIZED HEALTH CARE EDUCATION/TRAINING PROGRAM

## 2022-03-28 PROCEDURE — 97802 MEDICAL NUTRITION INDIV IN: CPT

## 2022-04-02 DIAGNOSIS — F33.41 RECURRENT MAJOR DEPRESSIVE DISORDER, IN PARTIAL REMISSION: ICD-10-CM

## 2022-04-04 RX ORDER — DULOXETIN HYDROCHLORIDE 30 MG/1
CAPSULE, DELAYED RELEASE ORAL
Qty: 90 CAPSULE | Refills: 0 | Status: SHIPPED | OUTPATIENT
Start: 2022-04-04 | End: 2022-06-17

## 2022-04-12 RX ORDER — LISINOPRIL 40 MG/1
40 TABLET ORAL DAILY
Qty: 90 TABLET | Refills: 0 | Status: SHIPPED | OUTPATIENT
Start: 2022-04-12 | End: 2022-10-12

## 2022-06-13 RX ORDER — ATORVASTATIN CALCIUM 40 MG/1
TABLET, FILM COATED ORAL
Qty: 90 TABLET | Refills: 0 | Status: SHIPPED | OUTPATIENT
Start: 2022-06-13

## 2022-06-17 DIAGNOSIS — F33.41 RECURRENT MAJOR DEPRESSIVE DISORDER, IN PARTIAL REMISSION: ICD-10-CM

## 2022-06-17 RX ORDER — DULOXETIN HYDROCHLORIDE 30 MG/1
CAPSULE, DELAYED RELEASE ORAL
Qty: 90 CAPSULE | Refills: 0 | Status: SHIPPED | OUTPATIENT
Start: 2022-06-17

## 2022-07-06 DIAGNOSIS — I10 ESSENTIAL HYPERTENSION: ICD-10-CM

## 2022-07-07 RX ORDER — METOPROLOL SUCCINATE 25 MG/1
TABLET, EXTENDED RELEASE ORAL
Qty: 90 TABLET | Refills: 0 | Status: SHIPPED | OUTPATIENT
Start: 2022-07-07

## 2022-10-12 RX ORDER — LISINOPRIL 40 MG/1
TABLET ORAL
Qty: 30 TABLET | Refills: 0 | Status: SHIPPED | OUTPATIENT
Start: 2022-10-12

## 2022-11-07 RX ORDER — ATORVASTATIN CALCIUM 40 MG/1
TABLET, FILM COATED ORAL
Qty: 90 TABLET | Refills: 0 | OUTPATIENT
Start: 2022-11-07

## 2022-11-13 DIAGNOSIS — F33.41 RECURRENT MAJOR DEPRESSIVE DISORDER, IN PARTIAL REMISSION: ICD-10-CM

## 2022-11-14 RX ORDER — DULOXETIN HYDROCHLORIDE 30 MG/1
CAPSULE, DELAYED RELEASE ORAL
Qty: 90 CAPSULE | Refills: 0 | OUTPATIENT
Start: 2022-11-14

## 2022-11-28 RX ORDER — LISINOPRIL 40 MG/1
TABLET ORAL
Qty: 60 TABLET | OUTPATIENT
Start: 2022-11-28

## 2022-12-06 RX ORDER — LISINOPRIL 40 MG/1
TABLET ORAL
Qty: 60 TABLET | OUTPATIENT
Start: 2022-12-06

## 2023-05-31 NOTE — PROGRESS NOTES
Fax received from [facility].     PT/INR results. \"Was drawn due to dentist procedure\".    Fax was handed to PCP.     · Bilateral lower art  With stress complete and spoke with Dr. Bourgeois about results read by Dr. Post Right Conclusion: The right NEHA is moderately reduced. Waveforms are consistent with aorto-iliac disease, femoral disease and popliteal disease. Moderate digital ischemia. The test is positive for exercise induced claudication.  Left Conclusion: The left NEHA is moderately reduced. 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 Severe digital ischemia. The test is negative for exercise induced claudication he will  Follow up with pt. ,pt to see Dr. Orourke in future

## 2025-02-26 NOTE — TELEPHONE ENCOUNTER
----- Message from James Bourgeois MD sent at 5/30/2019  4:17 PM EDT -----  CT low-dose of chest negative bone density reveals osteopenia which is thinning of the bones start calcium 600  plus D 400 daily supplement   [As Noted in HPI] : as noted in HPI